# Patient Record
Sex: MALE | Race: WHITE | Employment: FULL TIME | URBAN - METROPOLITAN AREA
[De-identification: names, ages, dates, MRNs, and addresses within clinical notes are randomized per-mention and may not be internally consistent; named-entity substitution may affect disease eponyms.]

---

## 2024-04-11 RX ORDER — OXYCODONE HCL 10 MG/1
10 TABLET, FILM COATED, EXTENDED RELEASE ORAL ONCE
Status: CANCELLED | OUTPATIENT
Start: 2024-04-11 | End: 2024-04-11

## 2024-04-11 RX ORDER — AMLODIPINE AND BENAZEPRIL HYDROCHLORIDE 5; 40 MG/1; MG/1
1 CAPSULE ORAL DAILY
COMMUNITY

## 2024-04-11 RX ORDER — METOPROLOL SUCCINATE 50 MG/1
50 TABLET, EXTENDED RELEASE ORAL DAILY
COMMUNITY

## 2024-04-11 RX ORDER — ROSUVASTATIN CALCIUM 10 MG/1
10 TABLET, COATED ORAL DAILY
COMMUNITY

## 2024-04-11 NOTE — PROGRESS NOTES
Name_______________________________________Printed:____________________  Date and time of surgery__5/9/24 @ 1230______________________Arrival Time:___1030 main hosp_____________   1. The instructions given regarding when and if a patient needs to stop oral intake prior to surgery varies.Follow the specific instructions you were given                  _x__Nothing to eat or to drink after Midnight the night before.                   ____Carbo loading or instructions will be given to select patients-if you have been given those instructions -please do the following                           The evening before your surgery after dinner before midnight drink 40 ounces of gatorade.If you are diabetic use sugar free.  The morning of surgery drink 40 ounces of water.This needs to be finished 3 hours prior to your surgery start time.    2. Take the following pills with a small sip of water on the morning of surgery__metoprolol_________________________________________________                  Do not take blood pressure medications ending in pril or sartan the lion prior to surgery or the morning of surgery. Dr Duenas's patient are not to take any medications the AM of surgery.         3. Aspirin, Ibuprofen, Advil, Naproxen, Vitamin E and other Anti-inflammatory products and supplements should be stopped for 5 -7days before surgery or as directed by your physician.   4. Check with your Doctor regarding stopping Plavix, Coumadin,Eliquis, Lovenox,Effient,Pradaxa,Xarelto, Fragmin or other blood thinners and follow their instructions.   5. Do not smoke, and do not drink any alcoholic beverages 24 hours prior to surgery.  This includes NA Beer.Refrain from the usage of any recreational drugs.   6. You may brush your teeth and gargle the morning of surgery.  DO NOT SWALLOW WATER   7. You MUST make arrangements for a responsible adult to stay on site while you are here and take you home after your surgery. You will not be allowed to

## 2024-04-18 ENCOUNTER — HOSPITAL ENCOUNTER (OUTPATIENT)
Age: 64
Discharge: HOME | End: 2024-04-18
Payer: COMMERCIAL

## 2024-04-18 DIAGNOSIS — Z01.818 PREOP TESTING: ICD-10-CM

## 2024-04-18 LAB
ABO + RH BLD: NORMAL
ALBUMIN SERPL-MCNC: 4.9 G/DL (ref 3.4–5)
ALBUMIN/GLOB SERPL: 1.6 {RATIO} (ref 1.1–2.2)
ALP SERPL-CCNC: 79 U/L (ref 40–129)
ALT SERPL-CCNC: 33 U/L (ref 10–40)
ANION GAP SERPL CALCULATED.3IONS-SCNC: 13 MMOL/L (ref 3–16)
APTT BLD: 29.8 SEC (ref 22.1–36.4)
AST SERPL-CCNC: 27 U/L (ref 15–37)
BASOPHILS # BLD: 0 K/UL (ref 0–0.2)
BASOPHILS NFR BLD: 0.5 %
BILIRUB SERPL-MCNC: 0.8 MG/DL (ref 0–1)
BILIRUB UR QL STRIP.AUTO: NEGATIVE
BLD GP AB SCN SERPL QL: NORMAL
BUN SERPL-MCNC: 12 MG/DL (ref 7–20)
CALCIUM SERPL-MCNC: 9.8 MG/DL (ref 8.3–10.6)
CHLORIDE SERPL-SCNC: 94 MMOL/L (ref 99–110)
CLARITY UR: CLEAR
CO2 SERPL-SCNC: 24 MMOL/L (ref 21–32)
COLOR UR: YELLOW
CREAT SERPL-MCNC: 0.7 MG/DL (ref 0.8–1.3)
DEPRECATED RDW RBC AUTO: 12.7 % (ref 12.4–15.4)
EOSINOPHIL # BLD: 0.5 K/UL (ref 0–0.6)
EOSINOPHIL NFR BLD: 8.4 %
GFR SERPLBLD CREATININE-BSD FMLA CKD-EPI: >90 ML/MIN/{1.73_M2}
GLUCOSE SERPL-MCNC: 104 MG/DL (ref 70–99)
GLUCOSE UR STRIP.AUTO-MCNC: NEGATIVE MG/DL
HCT VFR BLD AUTO: 38.9 % (ref 40.5–52.5)
HGB BLD-MCNC: 14 G/DL (ref 13.5–17.5)
HGB UR QL STRIP.AUTO: NEGATIVE
INR PPP: 0.98 (ref 0.85–1.15)
KETONES UR STRIP.AUTO-MCNC: NEGATIVE MG/DL
LEUKOCYTE ESTERASE UR QL STRIP.AUTO: NEGATIVE
LYMPHOCYTES # BLD: 1.7 K/UL (ref 1–5.1)
LYMPHOCYTES NFR BLD: 27 %
MCH RBC QN AUTO: 34.1 PG (ref 26–34)
MCHC RBC AUTO-ENTMCNC: 36.1 G/DL (ref 31–36)
MCV RBC AUTO: 94.6 FL (ref 80–100)
MONOCYTES # BLD: 0.7 K/UL (ref 0–1.3)
MONOCYTES NFR BLD: 10.5 %
NEUTROPHILS # BLD: 3.4 K/UL (ref 1.7–7.7)
NEUTROPHILS NFR BLD: 53.6 %
NITRITE UR QL STRIP.AUTO: NEGATIVE
PH UR STRIP.AUTO: 7 [PH] (ref 5–8)
PLATELET # BLD AUTO: 254 K/UL (ref 135–450)
PMV BLD AUTO: 7.3 FL (ref 5–10.5)
POTASSIUM SERPL-SCNC: 4 MMOL/L (ref 3.5–5.1)
PROT SERPL-MCNC: 7.9 G/DL (ref 6.4–8.2)
PROT UR STRIP.AUTO-MCNC: NEGATIVE MG/DL
PROTHROMBIN TIME: 13.2 SEC (ref 11.9–14.9)
RBC # BLD AUTO: 4.11 M/UL (ref 4.2–5.9)
SODIUM SERPL-SCNC: 131 MMOL/L (ref 136–145)
SP GR UR STRIP.AUTO: 1.01 (ref 1–1.03)
UA DIPSTICK W REFLEX MICRO PNL UR: NORMAL
URN SPEC COLLECT METH UR: NORMAL
UROBILINOGEN UR STRIP-ACNC: 1 E.U./DL
WBC # BLD AUTO: 6.4 K/UL (ref 4–11)

## 2024-04-18 PROCEDURE — 86850 RBC ANTIBODY SCREEN: CPT

## 2024-04-18 PROCEDURE — 85730 THROMBOPLASTIN TIME PARTIAL: CPT

## 2024-04-18 PROCEDURE — 85610 PROTHROMBIN TIME: CPT

## 2024-04-18 PROCEDURE — 80053 COMPREHEN METABOLIC PANEL: CPT

## 2024-04-18 PROCEDURE — 83036 HEMOGLOBIN GLYCOSYLATED A1C: CPT

## 2024-04-18 PROCEDURE — 87081 CULTURE SCREEN ONLY: CPT

## 2024-04-18 PROCEDURE — 36415 COLL VENOUS BLD VENIPUNCTURE: CPT

## 2024-04-18 PROCEDURE — 85025 COMPLETE CBC W/AUTO DIFF WBC: CPT

## 2024-04-18 PROCEDURE — 86901 BLOOD TYPING SEROLOGIC RH(D): CPT

## 2024-04-18 PROCEDURE — 81003 URINALYSIS AUTO W/O SCOPE: CPT

## 2024-04-18 PROCEDURE — 87086 URINE CULTURE/COLONY COUNT: CPT

## 2024-04-18 PROCEDURE — 86900 BLOOD TYPING SEROLOGIC ABO: CPT

## 2024-04-19 LAB
BACTERIA UR CULT: NORMAL
EKG ATRIAL RATE: 85 BPM
EKG DIAGNOSIS: NORMAL
EKG P AXIS: 61 DEGREES
EKG P-R INTERVAL: 176 MS
EKG Q-T INTERVAL: 370 MS
EKG QRS DURATION: 92 MS
EKG QTC CALCULATION (BAZETT): 440 MS
EKG R AXIS: 23 DEGREES
EKG T AXIS: 51 DEGREES
EKG VENTRICULAR RATE: 85 BPM
EST. AVERAGE GLUCOSE BLD GHB EST-MCNC: 96.8 MG/DL
HBA1C MFR BLD: 5 %

## 2024-04-20 LAB — MRSA SPEC QL CULT: NORMAL

## 2024-05-08 RX ORDER — TRANEXAMIC ACID 10 MG/ML
1000 INJECTION, SOLUTION INTRAVENOUS
Status: COMPLETED | OUTPATIENT
Start: 2024-05-09 | End: 2024-05-09

## 2024-05-08 RX ORDER — TRANEXAMIC ACID 10 MG/ML
1000 INJECTION, SOLUTION INTRAVENOUS ONCE
Status: COMPLETED | OUTPATIENT
Start: 2024-05-09 | End: 2024-05-09

## 2024-05-08 RX ORDER — DEXAMETHASONE SODIUM PHOSPHATE 4 MG/ML
10 INJECTION, SOLUTION INTRA-ARTICULAR; INTRALESIONAL; INTRAMUSCULAR; INTRAVENOUS; SOFT TISSUE ONCE
Status: COMPLETED | OUTPATIENT
Start: 2024-05-09 | End: 2024-05-09

## 2024-05-08 NOTE — DISCHARGE INSTRUCTIONS
[facility] DISCHARGE ORDER SET HARESH    1. (x   )  Activity instructions for ECF/HOME  Elevate extremity if swelling occurs  ICE to operative site   20 minutes/hour while awake  apply towel over dressing when icing  Continue the exercise program as prescribed by PT/OT   Use walker, crutches or cane with weightbearing instructions as indicated by MD  Do not ambulate without assistance until cleared by PT  Out of bed every hour while awake, ambulating minimum 10 minutes  Ankle pumps bilateral ankles 15x every hour while awake  IS Spirometer every 2 hrs while awake  Drink minimum  Eight  8 oz glasses of water daily       2. ( x  ) Initiate bowel care with the following medications  Colace 100 mg 1 tab by mouth twice daily, continue while on narcotics, hold for loose stools.    Call office if you have any difficulties with bowel movements/urinating after surgery        3. ( x  ) Admit s/p   (  x ) right    (   ) left    (  X )  revision TKA        4.  (x) START OUTPATIENT PHYSICAL THERAPY ON MONDAY  CALL OFFICE      TO   SCHEDULE [phone]        (  x  ) PT    (   ) OT    Rom, strengthening ex, ADL's       ( X  ) Home PT 3 x a week for 2 weeks vs       (X) outpatient PT 2-3 x a week for 4 weeks       (   ) PT daily    5. (x   )  Weight bearing/limitations   (  x ) right  (   ) left    (  X ) lower        extremity                 (  X ) FWB    (  x ) CPM  Start 0-60 degrees,day after surgery, increase 10 degrees daily or as tolerated, use minimum     6 hrs/day   Dc CPM when AROM > 120 degrees    6. (   )  Labs  Pt/INR  on Monday and Thursdays for 4 weeks, call results to   [phone]  before 3 pm for coumadin dose    7.( x  ) Dressing/wound care  Ok to shower on Saturday  Knee replacement: Remove ace wrap/gauze on post op day 2  Leave Clear Therabond dressing on until first post op appt with MD  If you had knee replacement cover  knee with saran wrap when showering, remove saran wrap after showering   Avoid

## 2024-05-09 ENCOUNTER — ANESTHESIA EVENT (OUTPATIENT)
Dept: OPERATING ROOM | Age: 64
End: 2024-05-09
Payer: COMMERCIAL

## 2024-05-09 ENCOUNTER — APPOINTMENT (OUTPATIENT)
Dept: GENERAL RADIOLOGY | Age: 64
End: 2024-05-09
Attending: ORTHOPAEDIC SURGERY
Payer: COMMERCIAL

## 2024-05-09 ENCOUNTER — HOSPITAL ENCOUNTER (OUTPATIENT)
Age: 64
Setting detail: SURGERY ADMIT
Discharge: HOME | End: 2024-05-09
Attending: ORTHOPAEDIC SURGERY | Admitting: ORTHOPAEDIC SURGERY
Payer: COMMERCIAL

## 2024-05-09 ENCOUNTER — ANESTHESIA (OUTPATIENT)
Dept: OPERATING ROOM | Age: 64
End: 2024-05-09
Payer: COMMERCIAL

## 2024-05-09 VITALS
OXYGEN SATURATION: 94 % | SYSTOLIC BLOOD PRESSURE: 136 MMHG | WEIGHT: 267.8 LBS | TEMPERATURE: 97.3 F | HEART RATE: 103 BPM | BODY MASS INDEX: 38.34 KG/M2 | DIASTOLIC BLOOD PRESSURE: 85 MMHG | HEIGHT: 70 IN | RESPIRATION RATE: 21 BRPM

## 2024-05-09 DIAGNOSIS — Z01.818 PREOP TESTING: Primary | ICD-10-CM

## 2024-05-09 DIAGNOSIS — T84.033A LOOSENING OF PROSTHESIS OF LEFT TOTAL KNEE REPLACEMENT, INITIAL ENCOUNTER (HCC): ICD-10-CM

## 2024-05-09 PROBLEM — I10 HTN (HYPERTENSION): Status: ACTIVE | Noted: 2024-05-09

## 2024-05-09 PROBLEM — T84.032A MECHANICAL LOOSENING OF INTERNAL RIGHT KNEE PROSTHETIC JOINT (HCC): Status: ACTIVE | Noted: 2024-05-09

## 2024-05-09 PROBLEM — E87.1 HYPONATREMIA: Status: ACTIVE | Noted: 2024-05-09

## 2024-05-09 PROBLEM — E78.00 HIGH CHOLESTEROL: Status: ACTIVE | Noted: 2024-05-09

## 2024-05-09 LAB
ABO + RH BLD: NORMAL
BLD GP AB SCN SERPL QL: NORMAL
GLUCOSE BLD-MCNC: 122 MG/DL (ref 70–99)
PERFORMED ON: ABNORMAL

## 2024-05-09 PROCEDURE — 2500000003 HC RX 250 WO HCPCS: Performed by: ORTHOPAEDIC SURGERY

## 2024-05-09 PROCEDURE — 7100000000 HC PACU RECOVERY - FIRST 15 MIN: Performed by: ORTHOPAEDIC SURGERY

## 2024-05-09 PROCEDURE — 6360000002 HC RX W HCPCS: Performed by: ANESTHESIOLOGY

## 2024-05-09 PROCEDURE — 86850 RBC ANTIBODY SCREEN: CPT

## 2024-05-09 PROCEDURE — 64447 NJX AA&/STRD FEMORAL NRV IMG: CPT | Performed by: ANESTHESIOLOGY

## 2024-05-09 PROCEDURE — 2580000003 HC RX 258: Performed by: ORTHOPAEDIC SURGERY

## 2024-05-09 PROCEDURE — 73560 X-RAY EXAM OF KNEE 1 OR 2: CPT

## 2024-05-09 PROCEDURE — 3700000001 HC ADD 15 MINUTES (ANESTHESIA): Performed by: ORTHOPAEDIC SURGERY

## 2024-05-09 PROCEDURE — C1776 JOINT DEVICE (IMPLANTABLE): HCPCS | Performed by: ORTHOPAEDIC SURGERY

## 2024-05-09 PROCEDURE — 7100000001 HC PACU RECOVERY - ADDTL 15 MIN: Performed by: ORTHOPAEDIC SURGERY

## 2024-05-09 PROCEDURE — 3600000004 HC SURGERY LEVEL 4 BASE: Performed by: ORTHOPAEDIC SURGERY

## 2024-05-09 PROCEDURE — 6360000002 HC RX W HCPCS: Performed by: ORTHOPAEDIC SURGERY

## 2024-05-09 PROCEDURE — 97165 OT EVAL LOW COMPLEX 30 MIN: CPT

## 2024-05-09 PROCEDURE — 7100000010 HC PHASE II RECOVERY - FIRST 15 MIN: Performed by: ORTHOPAEDIC SURGERY

## 2024-05-09 PROCEDURE — 86900 BLOOD TYPING SEROLOGIC ABO: CPT

## 2024-05-09 PROCEDURE — 2709999900 HC NON-CHARGEABLE SUPPLY: Performed by: ORTHOPAEDIC SURGERY

## 2024-05-09 PROCEDURE — 86901 BLOOD TYPING SEROLOGIC RH(D): CPT

## 2024-05-09 PROCEDURE — 3700000000 HC ANESTHESIA ATTENDED CARE: Performed by: ORTHOPAEDIC SURGERY

## 2024-05-09 PROCEDURE — 2500000003 HC RX 250 WO HCPCS: Performed by: NURSE ANESTHETIST, CERTIFIED REGISTERED

## 2024-05-09 PROCEDURE — 2720000010 HC SURG SUPPLY STERILE: Performed by: ORTHOPAEDIC SURGERY

## 2024-05-09 PROCEDURE — C1713 ANCHOR/SCREW BN/BN,TIS/BN: HCPCS | Performed by: ORTHOPAEDIC SURGERY

## 2024-05-09 PROCEDURE — 3600000014 HC SURGERY LEVEL 4 ADDTL 15MIN: Performed by: ORTHOPAEDIC SURGERY

## 2024-05-09 PROCEDURE — 6370000000 HC RX 637 (ALT 250 FOR IP): Performed by: ORTHOPAEDIC SURGERY

## 2024-05-09 PROCEDURE — 7100000011 HC PHASE II RECOVERY - ADDTL 15 MIN: Performed by: ORTHOPAEDIC SURGERY

## 2024-05-09 PROCEDURE — 6360000002 HC RX W HCPCS: Performed by: NURSE ANESTHETIST, CERTIFIED REGISTERED

## 2024-05-09 PROCEDURE — 97535 SELF CARE MNGMENT TRAINING: CPT

## 2024-05-09 PROCEDURE — 97161 PT EVAL LOW COMPLEX 20 MIN: CPT

## 2024-05-09 PROCEDURE — 6370000000 HC RX 637 (ALT 250 FOR IP): Performed by: ANESTHESIOLOGY

## 2024-05-09 PROCEDURE — 36415 COLL VENOUS BLD VENIPUNCTURE: CPT

## 2024-05-09 DEVICE — IMPLANTABLE DEVICE
Type: IMPLANTABLE DEVICE | Site: KNEE | Status: FUNCTIONAL
Brand: PERSONA®

## 2024-05-09 DEVICE — BONE PREPARATION KIT
Type: IMPLANTABLE DEVICE | Site: KNEE | Status: FUNCTIONAL
Brand: BIOPREP

## 2024-05-09 DEVICE — IMPLANTABLE DEVICE
Type: IMPLANTABLE DEVICE | Site: KNEE | Status: FUNCTIONAL
Brand: PERSONA® VIVACIT-E®

## 2024-05-09 DEVICE — IMPLANTABLE DEVICE
Type: IMPLANTABLE DEVICE | Site: KNEE | Status: FUNCTIONAL
Brand: BIOMET® BONE CEMENT R

## 2024-05-09 DEVICE — IMPLANTABLE DEVICE: Type: IMPLANTABLE DEVICE | Site: KNEE | Status: FUNCTIONAL

## 2024-05-09 RX ORDER — ONDANSETRON 2 MG/ML
INJECTION INTRAMUSCULAR; INTRAVENOUS PRN
Status: DISCONTINUED | OUTPATIENT
Start: 2024-05-09 | End: 2024-05-09 | Stop reason: SDUPTHER

## 2024-05-09 RX ORDER — ROSUVASTATIN CALCIUM 10 MG/1
10 TABLET, COATED ORAL DAILY
Status: DISCONTINUED | OUTPATIENT
Start: 2024-05-09 | End: 2024-05-09 | Stop reason: HOSPADM

## 2024-05-09 RX ORDER — KETAMINE HCL IN NACL, ISO-OSM 100MG/10ML
SYRINGE (ML) INJECTION PRN
Status: DISCONTINUED | OUTPATIENT
Start: 2024-05-09 | End: 2024-05-09 | Stop reason: SDUPTHER

## 2024-05-09 RX ORDER — AMLODIPINE AND BENAZEPRIL HYDROCHLORIDE 5; 40 MG/1; MG/1
1 CAPSULE ORAL DAILY
Status: DISCONTINUED | OUTPATIENT
Start: 2024-05-09 | End: 2024-05-09 | Stop reason: SDUPTHER

## 2024-05-09 RX ORDER — HYDROMORPHONE HYDROCHLORIDE 2 MG/ML
0.25 INJECTION, SOLUTION INTRAMUSCULAR; INTRAVENOUS; SUBCUTANEOUS
Status: CANCELLED | OUTPATIENT
Start: 2024-05-09

## 2024-05-09 RX ORDER — SODIUM CHLORIDE 0.9 % (FLUSH) 0.9 %
5-40 SYRINGE (ML) INJECTION EVERY 12 HOURS SCHEDULED
Status: DISCONTINUED | OUTPATIENT
Start: 2024-05-09 | End: 2024-05-09 | Stop reason: HOSPADM

## 2024-05-09 RX ORDER — BUPIVACAINE HYDROCHLORIDE 5 MG/ML
INJECTION, SOLUTION EPIDURAL; INTRACAUDAL
Status: COMPLETED | OUTPATIENT
Start: 2024-05-09 | End: 2024-05-09

## 2024-05-09 RX ORDER — ONDANSETRON 2 MG/ML
4 INJECTION INTRAMUSCULAR; INTRAVENOUS
Status: DISCONTINUED | OUTPATIENT
Start: 2024-05-09 | End: 2024-05-09 | Stop reason: HOSPADM

## 2024-05-09 RX ORDER — SODIUM CHLORIDE 9 MG/ML
INJECTION, SOLUTION INTRAVENOUS PRN
Status: CANCELLED | OUTPATIENT
Start: 2024-05-09

## 2024-05-09 RX ORDER — CEFADROXIL 500 MG/1
500 CAPSULE ORAL 2 TIMES DAILY
Qty: 14 CAPSULE | Refills: 0 | Status: SHIPPED | OUTPATIENT
Start: 2024-05-09 | End: 2024-05-16

## 2024-05-09 RX ORDER — LISINOPRIL 20 MG/1
40 TABLET ORAL DAILY
Status: DISCONTINUED | OUTPATIENT
Start: 2024-05-09 | End: 2024-05-09 | Stop reason: HOSPADM

## 2024-05-09 RX ORDER — FENTANYL CITRATE 50 UG/ML
25 INJECTION, SOLUTION INTRAMUSCULAR; INTRAVENOUS EVERY 5 MIN PRN
Status: DISCONTINUED | OUTPATIENT
Start: 2024-05-09 | End: 2024-05-09 | Stop reason: HOSPADM

## 2024-05-09 RX ORDER — MAGNESIUM HYDROXIDE 1200 MG/15ML
LIQUID ORAL CONTINUOUS PRN
Status: COMPLETED | OUTPATIENT
Start: 2024-05-09 | End: 2024-05-09

## 2024-05-09 RX ORDER — DOCUSATE SODIUM 100 MG/1
100 CAPSULE, LIQUID FILLED ORAL 2 TIMES DAILY
Qty: 60 CAPSULE | Refills: 0 | Status: SHIPPED | OUTPATIENT
Start: 2024-05-09 | End: 2024-06-08

## 2024-05-09 RX ORDER — SODIUM CHLORIDE 0.9 % (FLUSH) 0.9 %
5-40 SYRINGE (ML) INJECTION EVERY 12 HOURS SCHEDULED
Status: CANCELLED | OUTPATIENT
Start: 2024-05-09

## 2024-05-09 RX ORDER — SODIUM CHLORIDE 9 MG/ML
INJECTION, SOLUTION INTRAVENOUS PRN
Status: DISCONTINUED | OUTPATIENT
Start: 2024-05-09 | End: 2024-05-09 | Stop reason: HOSPADM

## 2024-05-09 RX ORDER — ASPIRIN 81 MG/1
81 TABLET ORAL DAILY
Qty: 30 TABLET | Refills: 0 | Status: SHIPPED | OUTPATIENT
Start: 2024-05-09

## 2024-05-09 RX ORDER — HYDRALAZINE HYDROCHLORIDE 20 MG/ML
10 INJECTION INTRAMUSCULAR; INTRAVENOUS
Status: DISCONTINUED | OUTPATIENT
Start: 2024-05-09 | End: 2024-05-09 | Stop reason: HOSPADM

## 2024-05-09 RX ORDER — LIDOCAINE HYDROCHLORIDE 20 MG/ML
INJECTION, SOLUTION EPIDURAL; INFILTRATION; INTRACAUDAL; PERINEURAL PRN
Status: DISCONTINUED | OUTPATIENT
Start: 2024-05-09 | End: 2024-05-09 | Stop reason: SDUPTHER

## 2024-05-09 RX ORDER — MAGNESIUM SULFATE HEPTAHYDRATE 500 MG/ML
INJECTION, SOLUTION INTRAMUSCULAR; INTRAVENOUS PRN
Status: DISCONTINUED | OUTPATIENT
Start: 2024-05-09 | End: 2024-05-09 | Stop reason: SDUPTHER

## 2024-05-09 RX ORDER — LABETALOL HYDROCHLORIDE 5 MG/ML
10 INJECTION, SOLUTION INTRAVENOUS
Status: DISCONTINUED | OUTPATIENT
Start: 2024-05-09 | End: 2024-05-09 | Stop reason: HOSPADM

## 2024-05-09 RX ORDER — ASPIRIN 81 MG/1
81 TABLET ORAL DAILY
Status: CANCELLED | OUTPATIENT
Start: 2024-05-09

## 2024-05-09 RX ORDER — OXYCODONE HYDROCHLORIDE 5 MG/1
5 TABLET ORAL
Status: COMPLETED | OUTPATIENT
Start: 2024-05-09 | End: 2024-05-09

## 2024-05-09 RX ORDER — SODIUM CHLORIDE, SODIUM LACTATE, POTASSIUM CHLORIDE, CALCIUM CHLORIDE 600; 310; 30; 20 MG/100ML; MG/100ML; MG/100ML; MG/100ML
INJECTION, SOLUTION INTRAVENOUS CONTINUOUS
Status: DISCONTINUED | OUTPATIENT
Start: 2024-05-09 | End: 2024-05-09 | Stop reason: HOSPADM

## 2024-05-09 RX ORDER — OXYCODONE HYDROCHLORIDE 5 MG/1
5 TABLET ORAL EVERY 4 HOURS PRN
Status: CANCELLED | OUTPATIENT
Start: 2024-05-09

## 2024-05-09 RX ORDER — AMLODIPINE BESYLATE 5 MG/1
5 TABLET ORAL DAILY
Status: DISCONTINUED | OUTPATIENT
Start: 2024-05-09 | End: 2024-05-09 | Stop reason: HOSPADM

## 2024-05-09 RX ORDER — OXYCODONE HCL 20 MG/1
20 TABLET, FILM COATED, EXTENDED RELEASE ORAL ONCE
Status: COMPLETED | OUTPATIENT
Start: 2024-05-09 | End: 2024-05-09

## 2024-05-09 RX ORDER — PROPOFOL 10 MG/ML
INJECTION, EMULSION INTRAVENOUS PRN
Status: DISCONTINUED | OUTPATIENT
Start: 2024-05-09 | End: 2024-05-09 | Stop reason: SDUPTHER

## 2024-05-09 RX ORDER — OMEPRAZOLE 20 MG/1
20 TABLET, DELAYED RELEASE ORAL DAILY
Qty: 30 TABLET | Refills: 3 | Status: SHIPPED | OUTPATIENT
Start: 2024-05-09

## 2024-05-09 RX ORDER — MELOXICAM 15 MG/1
15 TABLET ORAL DAILY
Qty: 30 TABLET | Refills: 3 | Status: SHIPPED | OUTPATIENT
Start: 2024-05-09

## 2024-05-09 RX ORDER — FENTANYL CITRATE 50 UG/ML
INJECTION, SOLUTION INTRAMUSCULAR; INTRAVENOUS PRN
Status: DISCONTINUED | OUTPATIENT
Start: 2024-05-09 | End: 2024-05-09 | Stop reason: SDUPTHER

## 2024-05-09 RX ORDER — HYDROMORPHONE HYDROCHLORIDE 2 MG/ML
0.5 INJECTION, SOLUTION INTRAMUSCULAR; INTRAVENOUS; SUBCUTANEOUS EVERY 5 MIN PRN
Status: DISCONTINUED | OUTPATIENT
Start: 2024-05-09 | End: 2024-05-09 | Stop reason: HOSPADM

## 2024-05-09 RX ORDER — ACETAMINOPHEN 500 MG
1000 TABLET ORAL EVERY 8 HOURS SCHEDULED
Status: CANCELLED | OUTPATIENT
Start: 2024-05-09

## 2024-05-09 RX ORDER — PROCHLORPERAZINE EDISYLATE 5 MG/ML
5 INJECTION INTRAMUSCULAR; INTRAVENOUS
Status: DISCONTINUED | OUTPATIENT
Start: 2024-05-09 | End: 2024-05-09 | Stop reason: HOSPADM

## 2024-05-09 RX ORDER — SODIUM CHLORIDE 0.9 % (FLUSH) 0.9 %
5-40 SYRINGE (ML) INJECTION PRN
Status: DISCONTINUED | OUTPATIENT
Start: 2024-05-09 | End: 2024-05-09 | Stop reason: HOSPADM

## 2024-05-09 RX ORDER — MIDAZOLAM HYDROCHLORIDE 2 MG/2ML
1 INJECTION, SOLUTION INTRAMUSCULAR; INTRAVENOUS ONCE
Status: COMPLETED | OUTPATIENT
Start: 2024-05-09 | End: 2024-05-09

## 2024-05-09 RX ORDER — METOPROLOL SUCCINATE 50 MG/1
50 TABLET, EXTENDED RELEASE ORAL DAILY
Status: DISCONTINUED | OUTPATIENT
Start: 2024-05-10 | End: 2024-05-09 | Stop reason: HOSPADM

## 2024-05-09 RX ORDER — FENTANYL CITRATE 50 UG/ML
50 INJECTION, SOLUTION INTRAMUSCULAR; INTRAVENOUS ONCE
Status: COMPLETED | OUTPATIENT
Start: 2024-05-09 | End: 2024-05-09

## 2024-05-09 RX ORDER — ROCURONIUM BROMIDE 10 MG/ML
INJECTION, SOLUTION INTRAVENOUS PRN
Status: DISCONTINUED | OUTPATIENT
Start: 2024-05-09 | End: 2024-05-09 | Stop reason: SDUPTHER

## 2024-05-09 RX ORDER — VANCOMYCIN HYDROCHLORIDE 1 G/20ML
INJECTION, POWDER, LYOPHILIZED, FOR SOLUTION INTRAVENOUS
Status: COMPLETED | OUTPATIENT
Start: 2024-05-09 | End: 2024-05-09

## 2024-05-09 RX ORDER — SODIUM CHLORIDE 9 MG/ML
INJECTION, SOLUTION INTRAVENOUS CONTINUOUS
Status: CANCELLED | OUTPATIENT
Start: 2024-05-09

## 2024-05-09 RX ORDER — SUCCINYLCHOLINE/SOD CL,ISO/PF 200MG/10ML
SYRINGE (ML) INTRAVENOUS PRN
Status: DISCONTINUED | OUTPATIENT
Start: 2024-05-09 | End: 2024-05-09 | Stop reason: SDUPTHER

## 2024-05-09 RX ORDER — OXYCODONE HYDROCHLORIDE 5 MG/1
10 TABLET ORAL EVERY 4 HOURS PRN
Status: CANCELLED | OUTPATIENT
Start: 2024-05-09

## 2024-05-09 RX ORDER — LIDOCAINE HYDROCHLORIDE 10 MG/ML
0.5 INJECTION, SOLUTION EPIDURAL; INFILTRATION; INTRACAUDAL; PERINEURAL ONCE
Status: DISCONTINUED | OUTPATIENT
Start: 2024-05-09 | End: 2024-05-09 | Stop reason: HOSPADM

## 2024-05-09 RX ORDER — GABAPENTIN 300 MG/1
300 CAPSULE ORAL NIGHTLY
Qty: 30 CAPSULE | Refills: 0 | Status: SHIPPED | OUTPATIENT
Start: 2024-05-09 | End: 2024-06-08

## 2024-05-09 RX ORDER — ACETAMINOPHEN 500 MG
1000 TABLET ORAL 3 TIMES DAILY
COMMUNITY
Start: 2024-05-09

## 2024-05-09 RX ORDER — SODIUM CHLORIDE 0.9 % (FLUSH) 0.9 %
5-40 SYRINGE (ML) INJECTION PRN
Status: CANCELLED | OUTPATIENT
Start: 2024-05-09

## 2024-05-09 RX ORDER — OXYCODONE HYDROCHLORIDE 5 MG/1
5 TABLET ORAL EVERY 6 HOURS PRN
Qty: 28 TABLET | Refills: 0 | Status: SHIPPED | OUTPATIENT
Start: 2024-05-09 | End: 2024-05-16

## 2024-05-09 RX ORDER — LIDOCAINE HYDROCHLORIDE 10 MG/ML
1 INJECTION, SOLUTION EPIDURAL; INFILTRATION; INTRACAUDAL; PERINEURAL
Status: DISCONTINUED | OUTPATIENT
Start: 2024-05-09 | End: 2024-05-09 | Stop reason: HOSPADM

## 2024-05-09 RX ORDER — HYDROMORPHONE HYDROCHLORIDE 2 MG/ML
0.5 INJECTION, SOLUTION INTRAMUSCULAR; INTRAVENOUS; SUBCUTANEOUS
Status: CANCELLED | OUTPATIENT
Start: 2024-05-09

## 2024-05-09 RX ORDER — BISACODYL 10 MG
10 SUPPOSITORY, RECTAL RECTAL DAILY PRN
Status: CANCELLED | OUTPATIENT
Start: 2024-05-09

## 2024-05-09 RX ORDER — DEXMEDETOMIDINE HYDROCHLORIDE 100 UG/ML
INJECTION, SOLUTION INTRAVENOUS PRN
Status: DISCONTINUED | OUTPATIENT
Start: 2024-05-09 | End: 2024-05-09 | Stop reason: SDUPTHER

## 2024-05-09 RX ADMIN — PROPOFOL 200 MG: 10 INJECTION, EMULSION INTRAVENOUS at 12:09

## 2024-05-09 RX ADMIN — FENTANYL CITRATE 50 MCG: 50 INJECTION, SOLUTION INTRAMUSCULAR; INTRAVENOUS at 13:36

## 2024-05-09 RX ADMIN — PROPOFOL 50 MG: 10 INJECTION, EMULSION INTRAVENOUS at 14:11

## 2024-05-09 RX ADMIN — SODIUM CHLORIDE, POTASSIUM CHLORIDE, SODIUM LACTATE AND CALCIUM CHLORIDE: 600; 310; 30; 20 INJECTION, SOLUTION INTRAVENOUS at 13:16

## 2024-05-09 RX ADMIN — HYDROMORPHONE HYDROCHLORIDE 0.5 MG: 2 INJECTION, SOLUTION INTRAMUSCULAR; INTRAVENOUS; SUBCUTANEOUS at 15:23

## 2024-05-09 RX ADMIN — Medication 160 MG: at 12:09

## 2024-05-09 RX ADMIN — DEXAMETHASONE SODIUM PHOSPHATE 10 MG: 4 INJECTION, SOLUTION INTRAMUSCULAR; INTRAVENOUS at 10:56

## 2024-05-09 RX ADMIN — MAGNESIUM SULFATE HEPTAHYDRATE 1 G: 500 INJECTION, SOLUTION INTRAMUSCULAR; INTRAVENOUS at 12:46

## 2024-05-09 RX ADMIN — ROCURONIUM BROMIDE 10 MG: 10 INJECTION, SOLUTION INTRAVENOUS at 13:02

## 2024-05-09 RX ADMIN — Medication 25 MG: at 12:42

## 2024-05-09 RX ADMIN — HYDROMORPHONE HYDROCHLORIDE 0.5 MG: 2 INJECTION, SOLUTION INTRAMUSCULAR; INTRAVENOUS; SUBCUTANEOUS at 14:58

## 2024-05-09 RX ADMIN — TRANEXAMIC ACID 1000 MG: 10 INJECTION, SOLUTION INTRAVENOUS at 13:45

## 2024-05-09 RX ADMIN — SUGAMMADEX 200 MG: 100 INJECTION, SOLUTION INTRAVENOUS at 14:16

## 2024-05-09 RX ADMIN — ROCURONIUM BROMIDE 10 MG: 10 INJECTION, SOLUTION INTRAVENOUS at 13:34

## 2024-05-09 RX ADMIN — OXYCODONE HYDROCHLORIDE 5 MG: 5 TABLET ORAL at 16:34

## 2024-05-09 RX ADMIN — DEXMEDETOMIDINE HYDROCHLORIDE 10 MCG: 100 INJECTION, SOLUTION INTRAVENOUS at 14:06

## 2024-05-09 RX ADMIN — DEXMEDETOMIDINE HYDROCHLORIDE 10 MCG: 100 INJECTION, SOLUTION INTRAVENOUS at 12:09

## 2024-05-09 RX ADMIN — FENTANYL CITRATE 100 MCG: 50 INJECTION, SOLUTION INTRAMUSCULAR; INTRAVENOUS at 12:09

## 2024-05-09 RX ADMIN — TRANEXAMIC ACID 1000 MG: 10 INJECTION, SOLUTION INTRAVENOUS at 11:57

## 2024-05-09 RX ADMIN — Medication 25 MG: at 12:27

## 2024-05-09 RX ADMIN — FENTANYL CITRATE 50 MCG: 0.05 INJECTION, SOLUTION INTRAMUSCULAR; INTRAVENOUS at 11:35

## 2024-05-09 RX ADMIN — ROCURONIUM BROMIDE 5 MG: 10 INJECTION, SOLUTION INTRAVENOUS at 12:09

## 2024-05-09 RX ADMIN — DEXMEDETOMIDINE HYDROCHLORIDE 10 MCG: 100 INJECTION, SOLUTION INTRAVENOUS at 12:21

## 2024-05-09 RX ADMIN — SODIUM CHLORIDE, POTASSIUM CHLORIDE, SODIUM LACTATE AND CALCIUM CHLORIDE: 600; 310; 30; 20 INJECTION, SOLUTION INTRAVENOUS at 10:59

## 2024-05-09 RX ADMIN — FENTANYL CITRATE 50 MCG: 50 INJECTION, SOLUTION INTRAMUSCULAR; INTRAVENOUS at 12:51

## 2024-05-09 RX ADMIN — ONDANSETRON 4 MG: 2 INJECTION INTRAMUSCULAR; INTRAVENOUS at 12:15

## 2024-05-09 RX ADMIN — SODIUM CHLORIDE 3000 MG: 900 INJECTION INTRAVENOUS at 11:58

## 2024-05-09 RX ADMIN — MIDAZOLAM 1 MG: 1 INJECTION INTRAMUSCULAR; INTRAVENOUS at 11:35

## 2024-05-09 RX ADMIN — BUPIVACAINE HYDROCHLORIDE 20 ML: 5 INJECTION, SOLUTION EPIDURAL; INTRACAUDAL at 11:38

## 2024-05-09 RX ADMIN — LIDOCAINE HYDROCHLORIDE 100 MG: 20 INJECTION, SOLUTION EPIDURAL; INFILTRATION; INTRACAUDAL; PERINEURAL at 12:09

## 2024-05-09 RX ADMIN — OXYCODONE HYDROCHLORIDE 20 MG: 20 TABLET, FILM COATED, EXTENDED RELEASE ORAL at 10:56

## 2024-05-09 RX ADMIN — ROCURONIUM BROMIDE 45 MG: 10 INJECTION, SOLUTION INTRAVENOUS at 12:15

## 2024-05-09 ASSESSMENT — PAIN SCALES - GENERAL
PAINLEVEL_OUTOF10: 6
PAINLEVEL_OUTOF10: 0
PAINLEVEL_OUTOF10: 7
PAINLEVEL_OUTOF10: 4
PAINLEVEL_OUTOF10: 7
PAINLEVEL_OUTOF10: 0

## 2024-05-09 ASSESSMENT — PAIN - FUNCTIONAL ASSESSMENT: PAIN_FUNCTIONAL_ASSESSMENT: NONE - DENIES PAIN

## 2024-05-09 ASSESSMENT — PAIN DESCRIPTION - ORIENTATION
ORIENTATION: RIGHT

## 2024-05-09 ASSESSMENT — PAIN DESCRIPTION - LOCATION
LOCATION: KNEE

## 2024-05-09 ASSESSMENT — ENCOUNTER SYMPTOMS: SHORTNESS OF BREATH: 0

## 2024-05-09 ASSESSMENT — PAIN DESCRIPTION - PAIN TYPE: TYPE: SURGICAL PAIN

## 2024-05-09 ASSESSMENT — PAIN DESCRIPTION - DESCRIPTORS
DESCRIPTORS: DISCOMFORT

## 2024-05-09 NOTE — OP NOTE
Operative Note      Patient: Jaden Tellez  YOB: 1960  MRN: [id number]    Date of Procedure: 5/9/2024    Pre-Op Diagnosis Codes:     * Mechanical loosening of internal right knee prosthetic joint, initial encounter ([facility]) [T84.032A]     * Presence of right artificial knee joint [Z96.651]    Post-Op Diagnosis: Same       Procedure(s):  REVISION RIGHT TOTAL KNEE ARTHROPLASTY-ADDUCTOR BLOCK-REG    Surgeon(s):  Hudson Ricketts MD    Assistant:   Surgical Assistant: Zeus Reilly; Leida Leroy  Physician Assistant: Bethel Astudillo PA    Anesthesia: Spinal    Estimated Blood Loss (mL): Minimal    Complications: None    Specimens:   * No specimens in log *    Implants:  Implant Name Type Inv. Item Serial No.  Lot No. LRB No. Used Action   CEMENT BNE 40GM HI VISC RADPQ FOR REV SURG - [id number]  CEMENT BNE 40GM HI VISC RADPQ FOR REV SURG  [employer]- [id number] Right 1 Implanted   CEMENT BNE 40GM HI VISC RADPQ FOR REV SURG - [id number]  CEMENT BNE 40GM HI VISC RADPQ FOR REV SURG  [employer]- [id number] Right 1 Implanted   KIT BNE NEYMAR PREP UNIV W/ RESTRIC INSRT 2 NEYMAR SCULP FEM CNL - [id number]  KIT BNE NEYMAR PREP UNIV W/ RESTRIC INSRT 2 NEYMAR SCULP FEM CNL  m[employer]al [facility] [id number] Right 1 Implanted   PSN REV 3MM OFFSET STEM EXT [id number] - [id number]  PSN REV 3MM OFFSET STEM EXT [id number]  [employer]- [id number] Right 1 Implanted   PSN TIB HALF BLOCK SZ EF RL 15MM - [id number]  PSN TIB HALF BLOCK SZ EF RL 15MM  [employer]- [id number] Right 1 Implanted   PSN TIB HALF BLOCK SZ EF RM 15MM - [id number]  PSN TIB HALF BLOCK SZ EF RM 15MM  [employer]- [id number] Right 1 Implanted   PSN REV TIB FIXED KEEL CMT SZ E R - [id number]  PSN REV TIB FIXED KEEL CMT SZ E R  [employer]- [id number] Right 1 Implanted         Drains: * No LDAs found *    Findings:  Infection Present At Time Of  BY: Hudson Ricketts MD, 5/9/2024                Routing History                     Electronically signed by Hudson Ricketts MD on 5/9/2024 at 1:58 PM

## 2024-05-09 NOTE — PROGRESS NOTES
Patient to PACU from OR. Patient arousable to voice. VSS. X1 R incision w/therabond, cast padding, and double ace wrap which is CDI.

## 2024-05-09 NOTE — PROGRESS NOTES
[facility] - Inpatient Rehabilitation Department   Phone: [phone]    Physical Therapy    [x] Initial Evaluation            [] Daily Treatment Note         [] Discharge Summary      Patient: Jaden Tellez   : 1960   MRN: [id number]   Date of Service:  2024  Admitting Diagnosis: Mechanical loosening of internal right knee prosthetic joint (HCC)  Current Admission Summary: Jaden Tellez is a 63 year old male s/p R total knee arthroplasty with Dr. Ricketts on .   Past Medical History:  has a past medical history of Hyperlipidemia and Hypertension.  Past Surgical History:  has a past surgical history that includes eye surgery (Bilateral); joint replacement; shoulder surgery (Right); Colonoscopy; and Tonsillectomy.  Discharge Recommendations: Jaden Tellez scored a 20/24 on the AM-PAC short mobility form. Current research shows that an AM-PAC score of 18 or greater is typically associated with a discharge to the patient's home setting. Based on the patient's AM-PAC score and their current functional mobility deficits, it is recommended that the patient have 2-3 sessions per week of Physical Therapy at d/c to increase the patient's independence.  At this time, this patient demonstrates the endurance and safety to discharge home with outpatient physical therapy and a follow up treatment frequency of 2-3x/wk.  Please see assessment section for further patient specific details. If patient discharges prior to next session this note will serve as a discharge summary.  Please see below for the latest assessment towards goals.     DME Required For Discharge: rolling walker  Precautions/Restrictions: high fall risk  Weight Bearing Restrictions: weight bearing as tolerated   [x] Right Lower Extremity      Required Braces/Orthotics: no braces required   [] Right  [] Left  Positional Restrictions:no positional restrictions    Pre-Admission Information   Lives With: spouse    Type of Home:  house  Home Layout: two level, bedroom/bathroom upstairs, 13 stairs to 2nd level with L HR  Home Access:  1 + 1 +1 step to enter without rails   Bathroom Layout: walk in shower  Bathroom Equipment:  no prior equipment  Toilet Height: standard height  Home Equipment: rolling walker, single point cane, crutches  Transfer Assistance: Independent without use of device  Ambulation Assistance:Independent without use of device, intermittent use of SPC due to pain the last few weeks   ADL Assistance: independent with all ADL's  IADL Assistance:  Patient's wife completes   Active :        [x] Yes  [] No  Hand Dominance: [] Left  [x] Right  Current Employment: full time employment.  Occupation: [employer]   Recent Falls: Denies falls within the last 6 months     Examination   Vision:   Vision Gross Assessment: WFL and Visual History: glaucoma, cataracts, other - corrective eye surgery   Hearing:   WFL  Observation:   General Observation:  Patient in semi-vences's position. Ace wrap on (R) LE, thigh-high RONNIE hose on (L) LE  Posture:   WFL  Sensation:   reports numbness and tingling in all extremities  Proprioception:    WFL  Tone:   Normotonic  ROM:   (L) Knee: Grossly 5-75 degrees     (R) Knee: WFL  Strength:   (B) LE strength grossly WFL  Therapist Clinical Decision Making (Complexity): low complexity  Clinical Presentation: stable      Subjective  General: Patient pleasant and agreeable to PT/OT evaluation. Patient's wife present during session.   Pain: 2/10.  Location: right knee  Pain Interventions: pain medication in place prior to arrival and ice applied       Functional Mobility  Bed Mobility:  Supine to Sit: stand by assistance  Sit to Supine: stand by assistance  Scooting: stand by assistance  Comments: HOB elevated   Transfers:  Sit to stand transfer: contact guard assistance  Stand to sit transfer: contact guard assistance  Toilet transfer: contact guard assistance  Car transfer: contact guard

## 2024-05-09 NOTE — PROGRESS NOTES
Patient has transitioned out of Phase 1 care. Patient's pain is tolerable. Patient denies any nausea. VSS.R pedal pulses present via doppler. X1 R knee incision w/therabond, cast padding, and double ace wrap.

## 2024-05-09 NOTE — ANESTHESIA PROCEDURE NOTES
(5) A permanent ultrasound image was saved in the patient's record.            Medications Administered  BUPivacaine (MARCAINE) PF injection 0.5% - Perineural   20 mL - 5/9/2024 11:38:00 AM

## 2024-05-09 NOTE — PROGRESS NOTES
[facility] - Inpatient Rehabilitation Department   Phone: [phone]    Occupational Therapy    [x] Initial Evaluation            [] Daily Treatment Note         [] Discharge Summary      Patient: Jaden Tellez   : 1960   MRN: [id number]   Date of Service:  2024    Admitting Diagnosis:  Mechanical loosening of internal right knee prosthetic joint (HCC)  Current Admission Summary: Jaden Tellez is a 63 year old male s/p R total knee arthroplasty with Dr. Ricketts on .   Past Medical History:  has a past medical history of Hyperlipidemia and Hypertension.  Past Surgical History:  has a past surgical history that includes eye surgery (Bilateral); joint replacement; shoulder surgery (Right); Colonoscopy; and Tonsillectomy.    Discharge Recommendations: Jaden Tellez scored a 21/24 on the AM-PAC ADL Inpatient form. Current research shows that an AM-PAC score of 18 or greater is typically associated with a discharge to the patient's home setting. Based on the patient's AM-PAC score, and their current ADL deficits, it is recommended that the patient have 2-3 sessions per week of Occupational Therapy at d/c to increase the patient's independence.  At this time, this patient demonstrates the endurance and safety to discharge home with outpatient OT (home vs OP services) and a follow up treatment frequency of 2-3x/wk.   Please see assessment section for further patient specific details.    If patient discharges prior to next session this note will serve as a discharge summary.  Please see below for the latest assessment towards goals.       DME Required For Discharge: rolling walker, shower chair with back, grab bars - toilet, grab bars - shower    Precautions/Restrictions: high fall risk  Weight Bearing Restrictions: weight bearing as tolerated  [] Right Upper Extremity  [] Left Upper Extremity [x] Right Lower Extremity  [] Left Lower Extremity     Required Braces/Orthotics: no braces  by assistance washed hands in stance at sink  Upper Extremity Dressing: setup assistance  Lower Extremity Dressing: setup assistance contact guard assistance completed in sitting  Toileting: setup assistance contact guard assistance : pt completed toileting at standard commode (all components) with GGA progressing to SBA. No assist required for LB dressing or pericare.    Instrumental Activities of Daily Living  No IADL completed on this date.    Functional Mobility  Bed Mobility:  Supine to Sit: stand by assistance  Sit to Supine: stand by assistance  Scooting: stand by assistance  Comments:  Transfers:  Sit to stand transfer:stand by assistance, contact guard assistance  Stand to sit transfer: stand by assistance, contact guard assistance  Toilet transfer: stand by assistance, contact guard assistance  Toilet transfer equipment: standard toilet, grab bars  Comments:  Functional Mobility  Functional Mobility Activity: to/from bathroom, additional 50'+50'+100'  Device Use: rolling walker  Required Assistance: stand by assistance, contact guard assistance  Comment: Pt negotiated x5 steps ascending and descending with CGA using B hand rails, which he reports he has at home. Pt able to demo appropriate steps technique. See PT notes for further details regarding gait quality and distances ambulated.   Balance:  Static Sitting Balance: good(+): independent with high level dynamic balance in unsupported position  Dynamic Sitting Balance: good: independent with functional balance in unsupported position  Static Standing Balance: fair (+): maintains balance at SBA/supervision without use of UE support  Dynamic Standing Balance: fair (-): maintains balance at CGA with use of UE support  Comments:    Other Therapeutic Interventions    Functional Outcomes  AM-PAC Inpatient Daily Activity Raw Score: 21                                    Cognition  WFL  Orientation:    alert and oriented x 4  Command Following:

## 2024-05-09 NOTE — ANESTHESIA PRE PROCEDURE
Department of Anesthesiology  Preprocedure Note       Name:  Jaden Tellez   Age:  63 y.o.  :  1960                                          MRN:  [id number]         Date:  2024      Surgeon: Surgeon(s):  Hudson Ricketts MD    Procedure: Procedure(s):  REVISION RIGHT TOTAL KNEE ARTHROPLASTY-ADDUCTOR BLOCK-REG    Medications prior to admission:   Prior to Admission medications    Medication Sig Start Date End Date Taking? Authorizing Provider   rosuvastatin (CRESTOR) 10 MG tablet Take 1 tablet by mouth daily   Yes ProviderLeonidas MD   amLODIPine-benazepril (LOTREL) 5-40 MG per capsule Take 1 capsule by mouth daily   Yes ProviderLeonidas MD   metoprolol succinate (TOPROL XL) 50 MG extended release tablet Take 1 tablet by mouth daily   Yes ProviderLeonidas MD       Current medications:    Current Facility-Administered Medications   Medication Dose Route Frequency Provider Last Rate Last Admin    lactated ringers IV soln infusion   IntraVENous Continuous Hudson Ricketts MD        lidocaine PF 1 % injection 0.5 mL  0.5 mL IntraDERmal Once Hudson Ricketts MD        oxyCODONE (OXYCONTIN) extended release tablet 20 mg  20 mg Oral Once Hudson Ricketts MD        ceFAZolin Sodium (ANCEF) 3,000 mg in sodium chloride 0.9 % 100 mL (mini-bag)  3,000 mg IntraVENous On Call to OR Hudson Ricketts MD        dexAMETHasone Sodium Phosphate injection 10 mg  10 mg IntraVENous Once Hudsno Ricketts MD        tranexamic acid-NaCl IVPB premix 1,000 mg  1,000 mg IntraVENous Once Hudson Ricketts MD        tranexamic acid-NaCl IVPB premix 1,000 mg  1,000 mg IntraVENous On Call to OR Hudson Ricketts MD        ortho mix (Ajenderfer) injection   Injection On Call Hudson Ricketts MD           Allergies:    Allergies   Allergen Reactions    Brimonidine Itching     eyedrops    Lipitor [Atorvastatin] Other (See Comments)     Muscle aches

## 2024-05-09 NOTE — PROGRESS NOTES
Nursing care provided to prep patient for surgery.    Pre-op orders completed.  Verified patient has completed 5 days of CHG pre-op showers.  Pneumatic sleeves and compression stockings applied as ordered. Teaching / education initiated regarding perioperative experience, post-op expectations, plan of care, and pain management during hospital stay.  Patient verbalized understanding. Pain Goal Expectations 5/10.  The care plan and education has been reviewed and mutually agreed upon with the patient.

## 2024-05-09 NOTE — H&P
Date of Surgery Update:      Jaden Tellez  was seen, history and physical examination reviewed, and patient examined by me today. There have been no significant clinical changes since the completion of the previous history and physical.    The patient and I discussed that this is an elective procedure/surgery. We discussed the risks of the procedure/surgery, including but not limited to what is outlined in the signed informed consent. We also discussed the risk of ruba COVID 19 while in the facility. We discussed the increased risk of a bad outcome should the patient contract COVID 19 during the post-procedural/post-operative period, given the patient’s current health condition, chronic conditions, and the added risk of COVID 19 in light of these conditions. The patient and I also discussed the risk of further postponing the procedure/surgery and other treatment alternatives, including non-procedural/surgical treatments.       The risk, benefits, and alternatives of the proposed procedure have been explained to the patient (or appropriate guardian) and understanding verbalized. All questions answered. Patient wishes to proceed.        Electronically signed by: Hudson Ricketts MD,5/9/2024,11:06 AM

## 2024-05-09 NOTE — FLOWSHEET NOTE
Discharge note:  Discharge order obtained, and discharge instructions reviewed. Patient educated, using the teach back method, about follow up instructions and discharge instructions. A completed copy of the AVS instructions given to patient and all questions answered. IV catheter removed without complaints, catheter intact, site WNL. Discharged to lobby via wheel chair per RN.

## 2024-05-09 NOTE — ANESTHESIA POSTPROCEDURE EVALUATION
Department of Anesthesiology  Postprocedure Note    Patient: Jaden Tellez  MRN: [id number]  YOB: 1960  Date of evaluation: 5/9/2024    Procedure Summary       Date: 05/09/24 Room / Location: [address]    Anesthesia Start: 1204 Anesthesia Stop: 1440    Procedure: REVISION RIGHT TOTAL KNEE ARTHROPLASTY-ADDUCTOR BLOCK-REG (Right: Knee) Diagnosis:       Mechanical loosening of internal right knee prosthetic joint, initial encounter ([facility])      Presence of right artificial knee joint      (Mechanical loosening of internal right knee prosthetic joint, initial encounter ([facility]) [T84.032A])      (Presence of right artificial knee joint [Z96.651])    Surgeons: Hudson Ricketts MD Responsible Provider: Michael Anne MD    Anesthesia Type: general, regional ASA Status: 2            Anesthesia Type: No value filed.    Rosi Phase I: Rosi Score: 8    Rosi Phase II:      Anesthesia Post Evaluation    Patient location during evaluation: PACU  Patient participation: complete - patient participated  Level of consciousness: awake and alert  Airway patency: patent  Nausea & Vomiting: no vomiting and no nausea  Cardiovascular status: hemodynamically stable  Respiratory status: acceptable  Hydration status: stable  Pain management: adequate    No notable events documented.

## 2025-01-07 ENCOUNTER — HOSPITAL ENCOUNTER (OUTPATIENT)
Age: 65
Discharge: HOME | End: 2025-01-07
Payer: COMMERCIAL

## 2025-01-07 DIAGNOSIS — Z01.818 PREOP TESTING: ICD-10-CM

## 2025-01-07 LAB
ABO + RH BLD: NORMAL
ALBUMIN SERPL-MCNC: 3.8 G/DL (ref 3.4–5)
ALBUMIN/GLOB SERPL: 1.2 {RATIO} (ref 1.1–2.2)
ALP SERPL-CCNC: 84 U/L (ref 40–129)
ALT SERPL-CCNC: 29 U/L (ref 10–40)
ANION GAP SERPL CALCULATED.3IONS-SCNC: 11 MMOL/L (ref 3–16)
APTT BLD: 35.2 SEC (ref 22.1–36.4)
AST SERPL-CCNC: 24 U/L (ref 15–37)
BACTERIA URNS QL MICRO: NORMAL /HPF
BASOPHILS # BLD: 0 K/UL (ref 0–0.2)
BASOPHILS NFR BLD: 0.4 %
BILIRUB SERPL-MCNC: 1 MG/DL (ref 0–1)
BILIRUB UR QL STRIP.AUTO: NEGATIVE
BLD GP AB SCN SERPL QL: NORMAL
BUN SERPL-MCNC: 9 MG/DL (ref 7–20)
CALCIUM SERPL-MCNC: 9 MG/DL (ref 8.3–10.6)
CHLORIDE SERPL-SCNC: 91 MMOL/L (ref 99–110)
CLARITY UR: CLEAR
CO2 SERPL-SCNC: 22 MMOL/L (ref 21–32)
COLOR UR: ABNORMAL
CREAT SERPL-MCNC: 0.7 MG/DL (ref 0.8–1.3)
DEPRECATED RDW RBC AUTO: 12.6 % (ref 12.4–15.4)
EKG ATRIAL RATE: 92 BPM
EKG DIAGNOSIS: NORMAL
EKG P AXIS: 65 DEGREES
EKG P-R INTERVAL: 166 MS
EKG Q-T INTERVAL: 366 MS
EKG QRS DURATION: 98 MS
EKG QTC CALCULATION (BAZETT): 452 MS
EKG R AXIS: 15 DEGREES
EKG T AXIS: 24 DEGREES
EKG VENTRICULAR RATE: 92 BPM
EOSINOPHIL # BLD: 0.1 K/UL (ref 0–0.6)
EOSINOPHIL NFR BLD: 0.9 %
EPI CELLS #/AREA URNS AUTO: 3 /HPF (ref 0–5)
EST. AVERAGE GLUCOSE BLD GHB EST-MCNC: 99.7 MG/DL
GFR SERPLBLD CREATININE-BSD FMLA CKD-EPI: >90 ML/MIN/{1.73_M2}
GLUCOSE SERPL-MCNC: 113 MG/DL (ref 70–99)
GLUCOSE UR STRIP.AUTO-MCNC: NEGATIVE MG/DL
HBA1C MFR BLD: 5.1 %
HCT VFR BLD AUTO: 33.9 % (ref 40.5–52.5)
HGB BLD-MCNC: 12.2 G/DL (ref 13.5–17.5)
HGB UR QL STRIP.AUTO: NEGATIVE
HYALINE CASTS #/AREA URNS AUTO: 0 /LPF (ref 0–8)
INR PPP: 1.22 (ref 0.85–1.15)
KETONES UR STRIP.AUTO-MCNC: ABNORMAL MG/DL
LEUKOCYTE ESTERASE UR QL STRIP.AUTO: ABNORMAL
LYMPHOCYTES # BLD: 0.7 K/UL (ref 1–5.1)
LYMPHOCYTES NFR BLD: 6.9 %
MCH RBC QN AUTO: 34.8 PG (ref 26–34)
MCHC RBC AUTO-ENTMCNC: 36 G/DL (ref 31–36)
MCV RBC AUTO: 96.5 FL (ref 80–100)
MONOCYTES # BLD: 0.9 K/UL (ref 0–1.3)
MONOCYTES NFR BLD: 9.4 %
NEUTROPHILS # BLD: 8.2 K/UL (ref 1.7–7.7)
NEUTROPHILS NFR BLD: 82.4 %
NITRITE UR QL STRIP.AUTO: NEGATIVE
PH UR STRIP.AUTO: 6.5 [PH] (ref 5–8)
PLATELET # BLD AUTO: 250 K/UL (ref 135–450)
PMV BLD AUTO: 7.3 FL (ref 5–10.5)
POTASSIUM SERPL-SCNC: 4.1 MMOL/L (ref 3.5–5.1)
PROT SERPL-MCNC: 7.1 G/DL (ref 6.4–8.2)
PROT UR STRIP.AUTO-MCNC: 30 MG/DL
PROTHROMBIN TIME: 15.6 SEC (ref 11.9–14.9)
RBC # BLD AUTO: 3.51 M/UL (ref 4.2–5.9)
RBC CLUMPS #/AREA URNS AUTO: 3 /HPF (ref 0–4)
SODIUM SERPL-SCNC: 124 MMOL/L (ref 136–145)
SP GR UR STRIP.AUTO: 1.01 (ref 1–1.03)
UA DIPSTICK W REFLEX MICRO PNL UR: YES
URN SPEC COLLECT METH UR: ABNORMAL
UROBILINOGEN UR STRIP-ACNC: 2 E.U./DL
WBC # BLD AUTO: 9.9 K/UL (ref 4–11)
WBC #/AREA URNS AUTO: 2 /HPF (ref 0–5)

## 2025-01-07 PROCEDURE — 80053 COMPREHEN METABOLIC PANEL: CPT

## 2025-01-07 PROCEDURE — 83036 HEMOGLOBIN GLYCOSYLATED A1C: CPT

## 2025-01-07 PROCEDURE — 87081 CULTURE SCREEN ONLY: CPT

## 2025-01-07 PROCEDURE — 85610 PROTHROMBIN TIME: CPT

## 2025-01-07 PROCEDURE — 85730 THROMBOPLASTIN TIME PARTIAL: CPT

## 2025-01-07 PROCEDURE — 86900 BLOOD TYPING SEROLOGIC ABO: CPT

## 2025-01-07 PROCEDURE — 86850 RBC ANTIBODY SCREEN: CPT

## 2025-01-07 PROCEDURE — 87205 SMEAR GRAM STAIN: CPT

## 2025-01-07 PROCEDURE — 87086 URINE CULTURE/COLONY COUNT: CPT

## 2025-01-07 PROCEDURE — 36415 COLL VENOUS BLD VENIPUNCTURE: CPT

## 2025-01-07 PROCEDURE — 86901 BLOOD TYPING SEROLOGIC RH(D): CPT

## 2025-01-07 PROCEDURE — 85025 COMPLETE CBC W/AUTO DIFF WBC: CPT

## 2025-01-07 PROCEDURE — 87075 CULTR BACTERIA EXCEPT BLOOD: CPT

## 2025-01-07 PROCEDURE — 87077 CULTURE AEROBIC IDENTIFY: CPT

## 2025-01-07 PROCEDURE — 87186 SC STD MICRODIL/AGAR DIL: CPT

## 2025-01-07 PROCEDURE — 81001 URINALYSIS AUTO W/SCOPE: CPT

## 2025-01-07 PROCEDURE — 87070 CULTURE OTHR SPECIMN AEROBIC: CPT

## 2025-01-07 RX ORDER — IBUPROFEN 200 MG
200 TABLET ORAL EVERY 6 HOURS PRN
COMMUNITY

## 2025-01-07 NOTE — PROGRESS NOTES
DATE _1/9/25__      PLACE __x_ [address]                          TIME _1516__                                             ___ [address]                           Arrival 1130 per office___                                                                                                                                                                                                        Surgeons office to give arrival time _____                                                                                                 If you have not received time contact your surgeon.                                                                                                                              Surgery dates,times and arrival times are subject to change.Please check with your surgeon.  Bring a photo I.D.,insurance card and form of payment if you have a co pay.  Plan for someone 18 years or older to stay on site while you are her and to take you home after surgery.You are not permitted to drive yourself home. You cannot leave via Uber, Lyft, Taxi or Medical Transport by yourself. You must have someone with you. It is strongly suggested that someone stay with you the first 24 hours after anesthesia. Your surgery will be cancelled if you do not have a ride home. A parent or legal guardian guardian must stay with a child until the child is discharged. Please do not bring other children.  A History/Physical is required to be completed and dated within 30 days of your surgery. To be done by PCP _x_ Surgeon ___or Hospitalist ___  You may be required to get labs _x_ EKG _x_ or a chest Xray ___ prior to surgery. To be done by _1/7___  Nothing to eat or drink after midnight the evening prior to surgery.  If your surgeon requires a bowel prep, follow their instructions.  You may brush your teeth.  Bring a complete list of your medications including vitamins and supplement with the name,dose and frequency.  Take the  following medications with a sip of water the AM of surgery _____metoprolol_______________________________   DR [facility] patients do not take any medications the AM of surgery.  If you can not be reached by phone to give specific medication instructions,you may use your inhalers,take your heart, blood pressure,seizure and thyroid medications with a sip of water the AM of surgery. Bring your rescue inhaler with you if you use one.  DO NOT take blood pressure medications ending in \"pril\"or \"sartan\" the evening prior or morning of surgery (such as Lisinopril or Losartan).  For blood thinners such as Plavix, Eliquis, Effient, Pradaxa, Xarelto or antiplatelets such as Pletal get instructions on if you need to stop prior to surgery and for how long.  Has instructions ___  to get instructions from surgeon and prescribing doctor and surgeon ___.  Aspirin,Ibuprofen,Advil,Aleve and any anti inflammatories along with vitamins should be stopped 5-7 days before surgery or as directed by surgeon. Tylenol is okay to take until the evening prior to surgery.  If you take a long-acting insulin at night,cut the dose in half the evening prior to surgery.  If you take a GLP-1 receptor (such as Trulicity,Mounjaro, Ozempic weekly),do not take 7 days prior to surgery. If you take a daily dose such as Rybelsus you must stop at least 24 hours prior to surgery.  If your blood sugar is low and you are symptomatic the AM of surgery you may take sips of a sugary clear liquid.  If you take any medication for weight loss(such as Adipex Naltrexone,Phentermine) you should not take for a minium of 72 hours prior to surgery.  Follow your surgeons instructions on showering prior to surgery.If you were not given specific instructions ,shower with an antibacterial soap such as dial the morning of surgery.  Wear clean loose fitting clothing.  Remove all piercings,rings and jewelry and leave at home.  Glasses,hearing aides and contacts will be removed prior

## 2025-01-08 ENCOUNTER — ANESTHESIA EVENT (OUTPATIENT)
Dept: OPERATING ROOM | Age: 65
End: 2025-01-08
Payer: COMMERCIAL

## 2025-01-08 LAB — BACTERIA UR CULT: NORMAL

## 2025-01-08 ASSESSMENT — LIFESTYLE VARIABLES: SMOKING_STATUS: 0

## 2025-01-08 NOTE — PROGRESS NOTES
Reviewed with Dr. Anne pt's sodium (124) done 1/7/25 - order received to do BMP day of surgery. Also notified Deann @ Dr. Ricketts's office.

## 2025-01-08 NOTE — ANESTHESIA PRE PROCEDURE
No results for input(s): \"POCGLU\", \"POCNA\", \"POCK\", \"POCCL\", \"POCBUN\", \"POCHEMO\", \"POCHCT\" in the last 72 hours.    Coags:   Lab Results   Component Value Date/Time    PROTIME 15.6 01/07/2025 11:35 AM    INR 1.22 01/07/2025 11:35 AM    APTT 35.2 01/07/2025 11:35 AM       HCG (If Applicable): No results found for: \"PREGTESTUR\", \"PREGSERUM\", \"HCG\", \"HCGQUANT\"     ABGs: No results found for: \"PHART\", \"PO2ART\", \"FRS4IEG\", \"KFD6KPQ\", \"BEART\", \"D6HSAXZQ\"     Type & Screen (If Applicable):  Lab Results   Component Value Date    ABORH B POS 01/07/2025    LABANTI NEG 01/07/2025       Drug/Infectious Status (If Applicable):  No results found for: \"HIV\", \"HEPCAB\"    COVID-19 Screening (If Applicable): No results found for: \"COVID19\"        Anesthesia Evaluation  Patient summary reviewed and Nursing notes reviewed   no history of anesthetic complications:   Airway: Mallampati: III  TM distance: >3 FB   Neck ROM: full  Mouth opening: > = 3 FB   Dental: normal exam         Pulmonary:Negative Pulmonary ROS and normal exam  breath sounds clear to auscultation      (-) COPD, asthma, sleep apnea and not a current smoker                           Cardiovascular:    (+) hypertension:, hyperlipidemia    (-) past MI, CAD, CABG/stent, dysrhythmias,  angina and  CHF    ECG reviewed  Rhythm: regular  Rate: normal           Beta Blocker:  Dose within 24 Hrs         Neuro/Psych:   Negative Neuro/Psych ROS     (-) seizures, TIA and CVA           GI/Hepatic/Renal:   (+) morbid obesity     (-) GERD, liver disease and no renal disease       Endo/Other:    (+) : arthritis: OA., electrolyte abnormalities.    (-) diabetes mellitus, hypothyroidism, hyperthyroidism               Abdominal:             Vascular:          Other Findings:             Anesthesia Plan      general and regional     ASA 3     (Consented for GA and adductor canal block, risks/benefits discussed including nerve damage, bleeding, infection, and local anesthetic toxicity,

## 2025-01-08 NOTE — DISCHARGE INSTRUCTIONS
prior to surgery should resume their pre op dose  and discontinue lovenox when INR = 2.0                   Call office with any questions  [phone]  Follow up appt  with ortho in 1 WEEK  ELECTRONICALLY SIGNED BY: Hudson Ricketts MD, Hudson Ricketts MD, 1/9/2025

## 2025-01-09 ENCOUNTER — HOSPITAL ENCOUNTER (OUTPATIENT)
Age: 65
LOS: 1 days | Discharge: HOME HEALTH | End: 2025-01-10
Attending: ORTHOPAEDIC SURGERY | Admitting: ORTHOPAEDIC SURGERY
Payer: COMMERCIAL

## 2025-01-09 ENCOUNTER — ANESTHESIA (OUTPATIENT)
Dept: OPERATING ROOM | Age: 65
End: 2025-01-09
Payer: COMMERCIAL

## 2025-01-09 DIAGNOSIS — M70.41 PREPATELLAR BURSITIS OF RIGHT KNEE: Primary | ICD-10-CM

## 2025-01-09 LAB
ABO + RH BLD: NORMAL
ANION GAP SERPL CALCULATED.3IONS-SCNC: 12 MMOL/L (ref 3–16)
BLD GP AB SCN SERPL QL: NORMAL
BUN SERPL-MCNC: 10 MG/DL (ref 7–20)
CALCIUM SERPL-MCNC: 9.2 MG/DL (ref 8.3–10.6)
CHLORIDE SERPL-SCNC: 95 MMOL/L (ref 99–110)
CO2 SERPL-SCNC: 24 MMOL/L (ref 21–32)
CREAT SERPL-MCNC: 0.8 MG/DL (ref 0.8–1.3)
GFR SERPLBLD CREATININE-BSD FMLA CKD-EPI: >90 ML/MIN/{1.73_M2}
GLUCOSE SERPL-MCNC: 103 MG/DL (ref 70–99)
MRSA SPEC QL CULT: NORMAL
POTASSIUM SERPL-SCNC: 4.3 MMOL/L (ref 3.5–5.1)
SODIUM SERPL-SCNC: 131 MMOL/L (ref 136–145)

## 2025-01-09 PROCEDURE — 86901 BLOOD TYPING SEROLOGIC RH(D): CPT

## 2025-01-09 PROCEDURE — 6370000000 HC RX 637 (ALT 250 FOR IP): Performed by: ORTHOPAEDIC SURGERY

## 2025-01-09 PROCEDURE — 6360000002 HC RX W HCPCS: Performed by: ORTHOPAEDIC SURGERY

## 2025-01-09 PROCEDURE — 6370000000 HC RX 637 (ALT 250 FOR IP): Performed by: PHYSICIAN ASSISTANT

## 2025-01-09 PROCEDURE — 80048 BASIC METABOLIC PNL TOTAL CA: CPT

## 2025-01-09 PROCEDURE — 64447 NJX AA&/STRD FEMORAL NRV IMG: CPT | Performed by: ANESTHESIOLOGY

## 2025-01-09 PROCEDURE — 2500000003 HC RX 250 WO HCPCS: Performed by: ORTHOPAEDIC SURGERY

## 2025-01-09 PROCEDURE — 86850 RBC ANTIBODY SCREEN: CPT

## 2025-01-09 PROCEDURE — 3700000000 HC ANESTHESIA ATTENDED CARE: Performed by: ORTHOPAEDIC SURGERY

## 2025-01-09 PROCEDURE — 36415 COLL VENOUS BLD VENIPUNCTURE: CPT

## 2025-01-09 PROCEDURE — 3600000004 HC SURGERY LEVEL 4 BASE: Performed by: ORTHOPAEDIC SURGERY

## 2025-01-09 PROCEDURE — 2720000010 HC SURG SUPPLY STERILE: Performed by: ORTHOPAEDIC SURGERY

## 2025-01-09 PROCEDURE — 3600000014 HC SURGERY LEVEL 4 ADDTL 15MIN: Performed by: ORTHOPAEDIC SURGERY

## 2025-01-09 PROCEDURE — 7100000000 HC PACU RECOVERY - FIRST 15 MIN: Performed by: ORTHOPAEDIC SURGERY

## 2025-01-09 PROCEDURE — 2580000003 HC RX 258: Performed by: ORTHOPAEDIC SURGERY

## 2025-01-09 PROCEDURE — 6360000002 HC RX W HCPCS: Performed by: ANESTHESIOLOGY

## 2025-01-09 PROCEDURE — 86900 BLOOD TYPING SEROLOGIC ABO: CPT

## 2025-01-09 PROCEDURE — 2709999900 HC NON-CHARGEABLE SUPPLY: Performed by: ORTHOPAEDIC SURGERY

## 2025-01-09 PROCEDURE — 3700000001 HC ADD 15 MINUTES (ANESTHESIA): Performed by: ORTHOPAEDIC SURGERY

## 2025-01-09 PROCEDURE — 2500000003 HC RX 250 WO HCPCS: Performed by: NURSE ANESTHETIST, CERTIFIED REGISTERED

## 2025-01-09 PROCEDURE — 7100000001 HC PACU RECOVERY - ADDTL 15 MIN: Performed by: ORTHOPAEDIC SURGERY

## 2025-01-09 PROCEDURE — 6360000002 HC RX W HCPCS: Performed by: NURSE ANESTHETIST, CERTIFIED REGISTERED

## 2025-01-09 RX ORDER — SODIUM CHLORIDE 9 MG/ML
INJECTION, SOLUTION INTRAVENOUS PRN
Status: DISCONTINUED | OUTPATIENT
Start: 2025-01-09 | End: 2025-01-10 | Stop reason: HOSPADM

## 2025-01-09 RX ORDER — SODIUM CHLORIDE 9 MG/ML
INJECTION, SOLUTION INTRAVENOUS CONTINUOUS
Status: DISCONTINUED | OUTPATIENT
Start: 2025-01-09 | End: 2025-01-10 | Stop reason: HOSPADM

## 2025-01-09 RX ORDER — ASPIRIN 81 MG/1
81 TABLET ORAL DAILY
Qty: 30 TABLET | Refills: 0 | Status: SHIPPED | OUTPATIENT
Start: 2025-01-09

## 2025-01-09 RX ORDER — MAGNESIUM HYDROXIDE 1200 MG/15ML
LIQUID ORAL CONTINUOUS PRN
Status: COMPLETED | OUTPATIENT
Start: 2025-01-09 | End: 2025-01-09

## 2025-01-09 RX ORDER — HYDROMORPHONE HYDROCHLORIDE 2 MG/ML
0.25 INJECTION, SOLUTION INTRAMUSCULAR; INTRAVENOUS; SUBCUTANEOUS
Status: DISCONTINUED | OUTPATIENT
Start: 2025-01-09 | End: 2025-01-10 | Stop reason: HOSPADM

## 2025-01-09 RX ORDER — SODIUM CHLORIDE 0.9 % (FLUSH) 0.9 %
5-40 SYRINGE (ML) INJECTION PRN
Status: DISCONTINUED | OUTPATIENT
Start: 2025-01-09 | End: 2025-01-10 | Stop reason: HOSPADM

## 2025-01-09 RX ORDER — ONDANSETRON 2 MG/ML
4 INJECTION INTRAMUSCULAR; INTRAVENOUS EVERY 6 HOURS PRN
Status: DISCONTINUED | OUTPATIENT
Start: 2025-01-09 | End: 2025-01-10 | Stop reason: HOSPADM

## 2025-01-09 RX ORDER — HYDROMORPHONE HYDROCHLORIDE 2 MG/ML
0.5 INJECTION, SOLUTION INTRAMUSCULAR; INTRAVENOUS; SUBCUTANEOUS
Status: DISCONTINUED | OUTPATIENT
Start: 2025-01-09 | End: 2025-01-10 | Stop reason: HOSPADM

## 2025-01-09 RX ORDER — SODIUM CHLORIDE 0.9 % (FLUSH) 0.9 %
5-40 SYRINGE (ML) INJECTION EVERY 12 HOURS SCHEDULED
Status: DISCONTINUED | OUTPATIENT
Start: 2025-01-09 | End: 2025-01-10 | Stop reason: HOSPADM

## 2025-01-09 RX ORDER — HYDROMORPHONE HYDROCHLORIDE 2 MG/ML
INJECTION, SOLUTION INTRAMUSCULAR; INTRAVENOUS; SUBCUTANEOUS
Status: DISCONTINUED | OUTPATIENT
Start: 2025-01-09 | End: 2025-01-09 | Stop reason: SDUPTHER

## 2025-01-09 RX ORDER — ACETAMINOPHEN 500 MG
1000 TABLET ORAL 3 TIMES DAILY
COMMUNITY
Start: 2025-01-09

## 2025-01-09 RX ORDER — LIDOCAINE HYDROCHLORIDE 20 MG/ML
INJECTION, SOLUTION INFILTRATION; PERINEURAL
Status: DISCONTINUED | OUTPATIENT
Start: 2025-01-09 | End: 2025-01-09 | Stop reason: SDUPTHER

## 2025-01-09 RX ORDER — SUCCINYLCHOLINE/SOD CL,ISO/PF 100 MG/5ML
SYRINGE (ML) INTRAVENOUS
Status: DISCONTINUED | OUTPATIENT
Start: 2025-01-09 | End: 2025-01-09 | Stop reason: SDUPTHER

## 2025-01-09 RX ORDER — BISACODYL 10 MG
10 SUPPOSITORY, RECTAL RECTAL DAILY PRN
Status: DISCONTINUED | OUTPATIENT
Start: 2025-01-09 | End: 2025-01-10 | Stop reason: HOSPADM

## 2025-01-09 RX ORDER — SODIUM CHLORIDE 9 MG/ML
INJECTION, SOLUTION INTRAVENOUS CONTINUOUS
Status: DISCONTINUED | OUTPATIENT
Start: 2025-01-09 | End: 2025-01-09 | Stop reason: HOSPADM

## 2025-01-09 RX ORDER — PROPOFOL 10 MG/ML
INJECTION, EMULSION INTRAVENOUS
Status: DISCONTINUED | OUTPATIENT
Start: 2025-01-09 | End: 2025-01-09 | Stop reason: SDUPTHER

## 2025-01-09 RX ORDER — DEXAMETHASONE SODIUM PHOSPHATE 10 MG/ML
10 INJECTION, SOLUTION INTRAMUSCULAR; INTRAVENOUS ONCE
Status: COMPLETED | OUTPATIENT
Start: 2025-01-09 | End: 2025-01-09

## 2025-01-09 RX ORDER — TRANEXAMIC ACID 10 MG/ML
1000 INJECTION, SOLUTION INTRAVENOUS ONCE
Status: COMPLETED | OUTPATIENT
Start: 2025-01-09 | End: 2025-01-09

## 2025-01-09 RX ORDER — OXYCODONE HYDROCHLORIDE 5 MG/1
5 TABLET ORAL EVERY 6 HOURS PRN
Qty: 28 TABLET | Refills: 0 | Status: SHIPPED | OUTPATIENT
Start: 2025-01-09 | End: 2025-01-16

## 2025-01-09 RX ORDER — LIDOCAINE HYDROCHLORIDE 10 MG/ML
0.5 INJECTION, SOLUTION EPIDURAL; INFILTRATION; INTRACAUDAL; PERINEURAL ONCE
Status: DISCONTINUED | OUTPATIENT
Start: 2025-01-09 | End: 2025-01-09 | Stop reason: HOSPADM

## 2025-01-09 RX ORDER — BUPIVACAINE HYDROCHLORIDE 5 MG/ML
INJECTION, SOLUTION EPIDURAL; INTRACAUDAL
Status: COMPLETED | OUTPATIENT
Start: 2025-01-09 | End: 2025-01-09

## 2025-01-09 RX ORDER — ONDANSETRON 4 MG/1
4 TABLET, ORALLY DISINTEGRATING ORAL EVERY 8 HOURS PRN
Status: DISCONTINUED | OUTPATIENT
Start: 2025-01-09 | End: 2025-01-10 | Stop reason: HOSPADM

## 2025-01-09 RX ORDER — ACETAMINOPHEN 500 MG
1000 TABLET ORAL EVERY 8 HOURS SCHEDULED
Status: DISCONTINUED | OUTPATIENT
Start: 2025-01-09 | End: 2025-01-10 | Stop reason: HOSPADM

## 2025-01-09 RX ORDER — DOCUSATE SODIUM 100 MG/1
100 CAPSULE, LIQUID FILLED ORAL 2 TIMES DAILY
COMMUNITY
Start: 2025-01-09

## 2025-01-09 RX ORDER — ROCURONIUM BROMIDE 10 MG/ML
INJECTION, SOLUTION INTRAVENOUS
Status: DISCONTINUED | OUTPATIENT
Start: 2025-01-09 | End: 2025-01-09 | Stop reason: SDUPTHER

## 2025-01-09 RX ORDER — OXYCODONE HCL 20 MG/1
20 TABLET, FILM COATED, EXTENDED RELEASE ORAL ONCE
Status: COMPLETED | OUTPATIENT
Start: 2025-01-09 | End: 2025-01-09

## 2025-01-09 RX ORDER — ASPIRIN 81 MG/1
81 TABLET ORAL DAILY
Status: DISCONTINUED | OUTPATIENT
Start: 2025-01-09 | End: 2025-01-10 | Stop reason: HOSPADM

## 2025-01-09 RX ORDER — TRANEXAMIC ACID 10 MG/ML
1000 INJECTION, SOLUTION INTRAVENOUS
Status: DISCONTINUED | OUTPATIENT
Start: 2025-01-09 | End: 2025-01-09 | Stop reason: HOSPADM

## 2025-01-09 RX ORDER — MUPIROCIN 20 MG/G
OINTMENT TOPICAL 2 TIMES DAILY
Status: DISCONTINUED | OUTPATIENT
Start: 2025-01-09 | End: 2025-01-10 | Stop reason: HOSPADM

## 2025-01-09 RX ORDER — OXYCODONE HYDROCHLORIDE 5 MG/1
5 TABLET ORAL EVERY 4 HOURS PRN
Status: DISCONTINUED | OUTPATIENT
Start: 2025-01-09 | End: 2025-01-10 | Stop reason: HOSPADM

## 2025-01-09 RX ORDER — OXYCODONE HYDROCHLORIDE 5 MG/1
10 TABLET ORAL EVERY 4 HOURS PRN
Status: DISCONTINUED | OUTPATIENT
Start: 2025-01-09 | End: 2025-01-10 | Stop reason: HOSPADM

## 2025-01-09 RX ADMIN — ROCURONIUM BROMIDE 20 MG: 10 INJECTION, SOLUTION INTRAVENOUS at 13:10

## 2025-01-09 RX ADMIN — SODIUM CHLORIDE 3000 MG: 900 INJECTION INTRAVENOUS at 13:04

## 2025-01-09 RX ADMIN — HYDROMORPHONE HYDROCHLORIDE 1 MG: 2 INJECTION, SOLUTION INTRAMUSCULAR; INTRAVENOUS; SUBCUTANEOUS at 13:55

## 2025-01-09 RX ADMIN — ROCURONIUM BROMIDE 10 MG: 10 INJECTION, SOLUTION INTRAVENOUS at 13:07

## 2025-01-09 RX ADMIN — DEXAMETHASONE SODIUM PHOSPHATE 10 MG: 10 INJECTION INTRAMUSCULAR; INTRAVENOUS at 12:23

## 2025-01-09 RX ADMIN — ROCURONIUM BROMIDE 20 MG: 10 INJECTION, SOLUTION INTRAVENOUS at 13:26

## 2025-01-09 RX ADMIN — HYDROMORPHONE HYDROCHLORIDE 0.6 MG: 2 INJECTION, SOLUTION INTRAMUSCULAR; INTRAVENOUS; SUBCUTANEOUS at 13:07

## 2025-01-09 RX ADMIN — Medication 25 MG: at 13:26

## 2025-01-09 RX ADMIN — SODIUM CHLORIDE: 9 INJECTION, SOLUTION INTRAVENOUS at 12:59

## 2025-01-09 RX ADMIN — ACETAMINOPHEN 1000 MG: 500 TABLET ORAL at 21:43

## 2025-01-09 RX ADMIN — HYDROMORPHONE HYDROCHLORIDE 0.4 MG: 2 INJECTION, SOLUTION INTRAMUSCULAR; INTRAVENOUS; SUBCUTANEOUS at 13:26

## 2025-01-09 RX ADMIN — Medication 25 MG: at 13:07

## 2025-01-09 RX ADMIN — BUPIVACAINE HYDROCHLORIDE 20 ML: 5 INJECTION, SOLUTION EPIDURAL; INTRACAUDAL at 12:41

## 2025-01-09 RX ADMIN — Medication 160 MG: at 13:07

## 2025-01-09 RX ADMIN — ASPIRIN 81 MG: 81 TABLET, COATED ORAL at 17:58

## 2025-01-09 RX ADMIN — MUPIROCIN: 20 OINTMENT TOPICAL at 21:43

## 2025-01-09 RX ADMIN — TRANEXAMIC ACID 1000 MG: 10 INJECTION, SOLUTION INTRAVENOUS at 13:11

## 2025-01-09 RX ADMIN — SUGAMMADEX 200 MG: 100 INJECTION, SOLUTION INTRAVENOUS at 13:57

## 2025-01-09 RX ADMIN — OXYCODONE HYDROCHLORIDE 20 MG: 20 TABLET, FILM COATED, EXTENDED RELEASE ORAL at 11:57

## 2025-01-09 RX ADMIN — LIDOCAINE HYDROCHLORIDE 100 MG: 20 INJECTION, SOLUTION INFILTRATION; PERINEURAL at 13:07

## 2025-01-09 RX ADMIN — SODIUM CHLORIDE: 9 INJECTION, SOLUTION INTRAVENOUS at 17:59

## 2025-01-09 RX ADMIN — VANCOMYCIN HYDROCHLORIDE 2000 MG: 1 INJECTION, POWDER, LYOPHILIZED, FOR SOLUTION INTRAVENOUS at 13:42

## 2025-01-09 RX ADMIN — PROPOFOL 200 MG: 10 INJECTION, EMULSION INTRAVENOUS at 13:07

## 2025-01-09 ASSESSMENT — PAIN SCALES - GENERAL
PAINLEVEL_OUTOF10: 2
PAINLEVEL_OUTOF10: 4

## 2025-01-09 ASSESSMENT — PAIN - FUNCTIONAL ASSESSMENT: PAIN_FUNCTIONAL_ASSESSMENT: 0-10

## 2025-01-09 ASSESSMENT — PAIN DESCRIPTION - LOCATION: LOCATION: KNEE

## 2025-01-09 ASSESSMENT — PAIN DESCRIPTION - ORIENTATION: ORIENTATION: RIGHT

## 2025-01-09 ASSESSMENT — PAIN DESCRIPTION - DESCRIPTORS: DESCRIPTORS: SORE

## 2025-01-09 NOTE — H&P
Date of Surgery Update:      Jaden Tellez  was seen, history and physical examination reviewed, and patient examined by me today. There have been no significant clinical changes since the completion of the previous history and physical.    The patient and I discussed that this is an elective procedure/surgery. We discussed the risks of the procedure/surgery, including but not limited to what is outlined in the signed informed consent. We also discussed the risk of ruba COVID 19 while in the facility. We discussed the increased risk of a bad outcome should the patient contract COVID 19 during the post-procedural/post-operative period, given the patient’s current health condition, chronic conditions, and the added risk of COVID 19 in light of these conditions. The patient and I also discussed the risk of further postponing the procedure/surgery and other treatment alternatives, including non-procedural/surgical treatments.       The risk, benefits, and alternatives of the proposed procedure have been explained to the patient (or appropriate guardian) and understanding verbalized. All questions answered. Patient wishes to proceed.        Electronically signed by: Hudson Ricketts MD,1/9/2025,12:30 PM

## 2025-01-09 NOTE — BRIEF OP NOTE
Brief Postoperative Note      Patient: Jaden Tellez  YOB: 1960  MRN: [id number]    Date of Procedure: 1/9/2025    Pre-Op Diagnosis Codes:      * Presence of right artificial knee joint [Z96.651]     * Other bursitis of knee, right knee [M70.51]    Post-Op Diagnosis: Same       Procedure(s):  RIGHT KNEE IRRIGATION AND DEBRIDMENT infectious pre patella bursa     Surgeon(s):  Hudson Ricketts MD    Assistant:  Surgical Assistant: Shakeel Patton  Physician Assistant: Bethel Astudillo PA    Anesthesia: General    Estimated Blood Loss (mL): less than 50     Complications: None    Specimens:   * No specimens in log *    Implants:  * No implants in log *      Drains: * No LDAs found *    Findings:  Infection Present At Time Of Surgery (PATOS) (choose all levels that have infection present):  - Superficial Infection (skin/subcutaneous) present as evidenced by fluid consistent with infection  Other Findings: infectious pre patella bursitis, no communication with the joint    Electronically signed by Hudson Ricketts MD on 1/9/2025 at 1:59 PM

## 2025-01-09 NOTE — ANESTHESIA POSTPROCEDURE EVALUATION
Department of Anesthesiology  Postprocedure Note    Patient: Jaden Tellez  MRN: [id number]  YOB: 1960  Date of evaluation: 1/9/2025    Procedure Summary       Date: 01/09/25 Room / Location: [address]    Anesthesia Start: 1259 Anesthesia Stop: 1412    Procedure: RIGHT KNEE IRRIGATION AND DEBRIDMENT (Right) Diagnosis:       Presence of right artificial knee joint      Other bursitis of knee, right knee      (Presence of right artificial knee joint [Z96.651])      (Other bursitis of knee, right knee [M70.51])    Surgeons: Hudson Ricketts MD Responsible Provider: Michael Anne MD    Anesthesia Type: general, regional ASA Status: 3            Anesthesia Type: No value filed.    Rosi Phase I: Rosi Score: 8    Rosi Phase II:      Anesthesia Post Evaluation    Patient location during evaluation: PACU  Patient participation: complete - patient participated  Level of consciousness: awake and alert  Airway patency: patent  Nausea & Vomiting: no vomiting and no nausea  Cardiovascular status: hemodynamically stable  Respiratory status: acceptable  Hydration status: stable  Pain management: adequate    No notable events documented.

## 2025-01-09 NOTE — PROGRESS NOTES
Pt resting quietly in bed, awake, denies pain medication at this time and states pain in right leg is tolerable. VSS, O2 sats 100% on 2 L NC. Dressing to right knee dry and intact, neurovascular assessment WDL. Knee immobilizer in place. Pt seen by anesthesia, phase 1 criteria met, family at bedside. Awaiting bed placement on 4T. Will monitor until room available.

## 2025-01-09 NOTE — PROGRESS NOTES
Room clean on 4T. Pt resting quietly in bed with eyes closed, awakens easily to voice. Will transfer.

## 2025-01-09 NOTE — ANESTHESIA PROCEDURE NOTES
Peripheral Block    Patient location during procedure: pre-op  Reason for block: post-op pain management and at surgeon's request  Start time: 1/9/2025 12:41 PM  End time: 1/9/2025 12:45 PM  Staffing  Performed: resident/CRNA   Anesthesiologist: Michael Anne MD  Resident/CRNA: Luz Hankins APRN - CRNA  Performed by: Michael Anne MD  Authorized by: Michael Anne MD    Preanesthetic Checklist  Completed: patient identified, IV checked, site marked, risks and benefits discussed, surgical/procedural consents, equipment checked, pre-op evaluation, timeout performed, anesthesia consent given, oxygen available and monitors applied/VS acknowledged  Peripheral Block   Patient position: supine  Prep: ChloraPrep  Provider prep: mask and sterile gloves  Patient monitoring: cardiac monitor, continuous pulse ox, frequent blood pressure checks, IV access, oxygen and responsive to questions  Block type: Femoral  Adductor canal  Laterality: right  Injection technique: single-shot  Guidance: ultrasound guided  Local infiltration: lidocaine  Infiltration strength: 1 %  Local infiltration: lidocaine  Dose: 1 mL    Needle   Needle type: insulated echogenic nerve stimulator needle   Needle gauge: 20 G  Needle localization: ultrasound guidance and anatomical landmarks  Needle length: 10 cm  Assessment   Injection assessment: negative aspiration for heme, no paresthesia on injection, local visualized surrounding nerve on ultrasound and no intravascular symptoms  Paresthesia pain: none  Slow fractionated injection: yes  Hemodynamics: stable  Outcomes: uncomplicated and patient tolerated procedure well    Additional Notes  U/S [id number].  (1) Under ultrasound guidance, a 20 gauge needle was inserted and placed in close proximity to the ac nerve.  (2) Ultrasound was also used to visualize the spread of the anesthetic in close proximity to the nerve being blocked. (3) The nerve appeared anatomically normal, and (4

## 2025-01-09 NOTE — PROGRESS NOTES
Pt arrived from OR to PACU, awakens to voice, states pain in right knee is tolerable at this time. VSS, O2 sats 97% on 3 L NC. Dressing to right knee dry and intact, neurovascular assessment WDL. Will monitor.

## 2025-01-10 VITALS
HEART RATE: 95 BPM | RESPIRATION RATE: 16 BRPM | HEIGHT: 70 IN | WEIGHT: 274 LBS | TEMPERATURE: 97.9 F | SYSTOLIC BLOOD PRESSURE: 168 MMHG | DIASTOLIC BLOOD PRESSURE: 84 MMHG | BODY MASS INDEX: 39.22 KG/M2 | OXYGEN SATURATION: 97 %

## 2025-01-10 LAB
ANION GAP SERPL CALCULATED.3IONS-SCNC: 12 MMOL/L (ref 3–16)
BASOPHILS # BLD: 0.1 K/UL (ref 0–0.2)
BASOPHILS NFR BLD: 0.9 %
BUN SERPL-MCNC: 10 MG/DL (ref 7–20)
CALCIUM SERPL-MCNC: 8.9 MG/DL (ref 8.3–10.6)
CHLORIDE SERPL-SCNC: 97 MMOL/L (ref 99–110)
CO2 SERPL-SCNC: 22 MMOL/L (ref 21–32)
CREAT SERPL-MCNC: 0.7 MG/DL (ref 0.8–1.3)
CRP SERPL-MCNC: 42.1 MG/L (ref 0–5.1)
DEPRECATED RDW RBC AUTO: 12.4 % (ref 12.4–15.4)
EOSINOPHIL # BLD: 0 K/UL (ref 0–0.6)
EOSINOPHIL NFR BLD: 0.1 %
ERYTHROCYTE [SEDIMENTATION RATE] IN BLOOD BY WESTERGREN METHOD: 83 MM/HR (ref 0–20)
GFR SERPLBLD CREATININE-BSD FMLA CKD-EPI: >90 ML/MIN/{1.73_M2}
GLUCOSE SERPL-MCNC: 103 MG/DL (ref 70–99)
HCT VFR BLD AUTO: 32.7 % (ref 40.5–52.5)
HGB BLD-MCNC: 11.9 G/DL (ref 13.5–17.5)
LYMPHOCYTES # BLD: 0.8 K/UL (ref 1–5.1)
LYMPHOCYTES NFR BLD: 9.9 %
MCH RBC QN AUTO: 34.2 PG (ref 26–34)
MCHC RBC AUTO-ENTMCNC: 36.4 G/DL (ref 31–36)
MCV RBC AUTO: 94.1 FL (ref 80–100)
MONOCYTES # BLD: 0.9 K/UL (ref 0–1.3)
MONOCYTES NFR BLD: 12.2 %
NEUTROPHILS # BLD: 6 K/UL (ref 1.7–7.7)
NEUTROPHILS NFR BLD: 76.9 %
PLATELET # BLD AUTO: 343 K/UL (ref 135–450)
PMV BLD AUTO: 6.4 FL (ref 5–10.5)
POTASSIUM SERPL-SCNC: 4 MMOL/L (ref 3.5–5.1)
RBC # BLD AUTO: 3.47 M/UL (ref 4.2–5.9)
SODIUM SERPL-SCNC: 131 MMOL/L (ref 136–145)
WBC # BLD AUTO: 7.8 K/UL (ref 4–11)

## 2025-01-10 PROCEDURE — 85025 COMPLETE CBC W/AUTO DIFF WBC: CPT

## 2025-01-10 PROCEDURE — 36415 COLL VENOUS BLD VENIPUNCTURE: CPT

## 2025-01-10 PROCEDURE — 97161 PT EVAL LOW COMPLEX 20 MIN: CPT

## 2025-01-10 PROCEDURE — 86140 C-REACTIVE PROTEIN: CPT

## 2025-01-10 PROCEDURE — 80048 BASIC METABOLIC PNL TOTAL CA: CPT

## 2025-01-10 PROCEDURE — 6370000000 HC RX 637 (ALT 250 FOR IP): Performed by: ORTHOPAEDIC SURGERY

## 2025-01-10 PROCEDURE — 6360000002 HC RX W HCPCS: Performed by: ORTHOPAEDIC SURGERY

## 2025-01-10 PROCEDURE — 99222 1ST HOSP IP/OBS MODERATE 55: CPT | Performed by: INTERNAL MEDICINE

## 2025-01-10 PROCEDURE — 6360000002 HC RX W HCPCS: Performed by: INTERNAL MEDICINE

## 2025-01-10 PROCEDURE — 2500000003 HC RX 250 WO HCPCS: Performed by: INTERNAL MEDICINE

## 2025-01-10 PROCEDURE — C1751 CATH, INF, PER/CENT/MIDLINE: HCPCS

## 2025-01-10 PROCEDURE — 36569 INSJ PICC 5 YR+ W/O IMAGING: CPT

## 2025-01-10 PROCEDURE — 85652 RBC SED RATE AUTOMATED: CPT

## 2025-01-10 PROCEDURE — 2580000003 HC RX 258: Performed by: ORTHOPAEDIC SURGERY

## 2025-01-10 PROCEDURE — 2500000003 HC RX 250 WO HCPCS: Performed by: ORTHOPAEDIC SURGERY

## 2025-01-10 PROCEDURE — 6370000000 HC RX 637 (ALT 250 FOR IP): Performed by: INTERNAL MEDICINE

## 2025-01-10 RX ORDER — ROSUVASTATIN CALCIUM 10 MG/1
10 TABLET, COATED ORAL NIGHTLY
Status: DISCONTINUED | OUTPATIENT
Start: 2025-01-10 | End: 2025-01-10 | Stop reason: HOSPADM

## 2025-01-10 RX ORDER — AMLODIPINE BESYLATE 5 MG/1
5 TABLET ORAL DAILY
Status: DISCONTINUED | OUTPATIENT
Start: 2025-01-10 | End: 2025-01-10 | Stop reason: HOSPADM

## 2025-01-10 RX ORDER — LIDOCAINE HYDROCHLORIDE 10 MG/ML
50 INJECTION, SOLUTION EPIDURAL; INFILTRATION; INTRACAUDAL; PERINEURAL ONCE
Status: DISCONTINUED | OUTPATIENT
Start: 2025-01-10 | End: 2025-01-10 | Stop reason: HOSPADM

## 2025-01-10 RX ORDER — AMLODIPINE AND BENAZEPRIL HYDROCHLORIDE 5; 40 MG/1; MG/1
1 CAPSULE ORAL DAILY
Status: DISCONTINUED | OUTPATIENT
Start: 2025-01-10 | End: 2025-01-10 | Stop reason: CLARIF

## 2025-01-10 RX ORDER — CLOTRIMAZOLE 1 %
CREAM (GRAM) TOPICAL
Qty: 30 G | Refills: 0 | Status: SHIPPED | OUTPATIENT
Start: 2025-01-10 | End: 2025-01-17

## 2025-01-10 RX ORDER — METOPROLOL SUCCINATE 50 MG/1
50 TABLET, EXTENDED RELEASE ORAL DAILY
Status: DISCONTINUED | OUTPATIENT
Start: 2025-01-10 | End: 2025-01-10 | Stop reason: HOSPADM

## 2025-01-10 RX ORDER — SODIUM CHLORIDE 0.9 % (FLUSH) 0.9 %
5-40 SYRINGE (ML) INJECTION PRN
Status: DISCONTINUED | OUTPATIENT
Start: 2025-01-10 | End: 2025-01-10 | Stop reason: HOSPADM

## 2025-01-10 RX ORDER — SODIUM CHLORIDE 0.9 % (FLUSH) 0.9 %
5-40 SYRINGE (ML) INJECTION EVERY 12 HOURS SCHEDULED
Status: DISCONTINUED | OUTPATIENT
Start: 2025-01-10 | End: 2025-01-10 | Stop reason: HOSPADM

## 2025-01-10 RX ORDER — SODIUM CHLORIDE 9 MG/ML
INJECTION, SOLUTION INTRAVENOUS PRN
Status: DISCONTINUED | OUTPATIENT
Start: 2025-01-10 | End: 2025-01-10 | Stop reason: HOSPADM

## 2025-01-10 RX ORDER — MICONAZOLE NITRATE 20 MG/G
CREAM TOPICAL 2 TIMES DAILY
Status: DISCONTINUED | OUTPATIENT
Start: 2025-01-10 | End: 2025-01-10 | Stop reason: HOSPADM

## 2025-01-10 RX ORDER — LISINOPRIL 20 MG/1
40 TABLET ORAL DAILY
Status: DISCONTINUED | OUTPATIENT
Start: 2025-01-10 | End: 2025-01-10 | Stop reason: HOSPADM

## 2025-01-10 RX ADMIN — VANCOMYCIN HYDROCHLORIDE 2000 MG: 1 INJECTION, POWDER, LYOPHILIZED, FOR SOLUTION INTRAVENOUS at 00:05

## 2025-01-10 RX ADMIN — MUPIROCIN: 20 OINTMENT TOPICAL at 08:35

## 2025-01-10 RX ADMIN — ACETAMINOPHEN 1000 MG: 500 TABLET ORAL at 18:45

## 2025-01-10 RX ADMIN — WATER 2000 MG: 1 INJECTION INTRAMUSCULAR; INTRAVENOUS; SUBCUTANEOUS at 18:47

## 2025-01-10 RX ADMIN — ASPIRIN 81 MG: 81 TABLET, COATED ORAL at 08:33

## 2025-01-10 RX ADMIN — SODIUM CHLORIDE, PRESERVATIVE FREE 10 ML: 5 INJECTION INTRAVENOUS at 00:02

## 2025-01-10 RX ADMIN — AMLODIPINE BESYLATE 5 MG: 5 TABLET ORAL at 08:33

## 2025-01-10 RX ADMIN — WATER 2000 MG: 1 INJECTION INTRAMUSCULAR; INTRAVENOUS; SUBCUTANEOUS at 13:13

## 2025-01-10 RX ADMIN — METOPROLOL SUCCINATE 50 MG: 50 TABLET, EXTENDED RELEASE ORAL at 08:33

## 2025-01-10 RX ADMIN — LISINOPRIL 40 MG: 20 TABLET ORAL at 08:34

## 2025-01-10 RX ADMIN — ACETAMINOPHEN 1000 MG: 500 TABLET ORAL at 05:18

## 2025-01-10 ASSESSMENT — PAIN DESCRIPTION - LOCATION
LOCATION: KNEE
LOCATION: LEG

## 2025-01-10 ASSESSMENT — PAIN DESCRIPTION - ORIENTATION
ORIENTATION: RIGHT
ORIENTATION: RIGHT

## 2025-01-10 ASSESSMENT — PAIN SCALES - GENERAL
PAINLEVEL_OUTOF10: 7
PAINLEVEL_OUTOF10: 2

## 2025-01-10 ASSESSMENT — PAIN DESCRIPTION - DESCRIPTORS: DESCRIPTORS: SORE

## 2025-01-10 NOTE — PROGRESS NOTES
tasks  Active :        [x] Yes  [] No  Hand Dominance: [] Left  [x] Right  Current Employment: full time employment.  Occupation: manage [employer]  Hobbies:   Recent Falls: Pt reports no recent falls.     Available Assistance at Discharge: 24 hr physical assistance available    Examination   Vision:   Vision Gross Assessment: WFL  Hearing:   WFL  Observation:   General Observation:  Pt seated in recliner with knee immobilizer in place on RLE.   Posture:   Good  Sensation:   denies numbness and tingling  Proprioception:    WFL  Tone:   Normotonic  ROM:   LLE WFL, R ankle and hip WFL, unable to assess R knee due to immobilizer  Strength:   LLE WFL, R hip and ankle WFL (unable to assess R knee due to immobilizer)  Therapist Clinical Decision Making (Complexity): low complexity  Clinical Presentation: stable      Subjective  General: Pt agreeable to PT eval.   Pain: 2/10.  Location: R knee  Pain Interventions: pain medication in place prior to arrival       Functional Mobility  Bed Mobility:  Bed mobility not completed on this date.  Comments: Pt seated in chair at beginning and end of session.   Transfers:  Sit to stand transfer: Independent  Stand to sit transfer: Independent  Comments:  Ambulation Trial 1:  Surface:level surface  Assistive Device: rolling walker  Assistance: modified independent  Distance: 150'  Gait Mechanics: appropriate jacquelyn, appropriate Perry, swing through gait pattern, slight hip hiking noted to R to clear R foot during swing phase  Comments:    Ambulation Trial 2:  Surface:level surface  Assistive Device: no device  Assistance: Independent  Distance: 20+30'+150'  Gait Mechanics: same as above  Comments:    Stair Mobility Trial 1:  Number of Steps: 4  Step Height: 6 inch  Hand Rails: (L) ascending handrail  Assistance: modified independent  Comments: Pt negotiated with appropriate sequencing, no LOB.   Stair Mobility Trial 2:  Number of Steps: 1  Step Height: 6 inch  Device: no

## 2025-01-10 NOTE — PROGRESS NOTES
Department of Orthopedic Surgery     Progress Note    Subjective:   Admit Day:1/9/2025    Patient is comfortable appearing.  Denies chest pain, shortness of air or calf pain.  Tolerating PO.     Objective::    height is 1.778 m (5' 10\") and weight is 124.3 kg (274 lb). His oral temperature is 97.9 °F (36.6 °C). His blood pressure is 153/87 (abnormal) and his pulse is 82. His respiration is 16 and oxygen saturation is 97%.     Dressing cdi, calf soft, neg kyle, nvi      Labs:  Lab Results   Component Value Date/Time    WBC 9.9 01/07/2025 11:35 AM    HGB 12.2 01/07/2025 11:35 AM    HCT 33.9 01/07/2025 11:35 AM     01/07/2025 11:35 AM       Lab Results   Component Value Date/Time    INR 1.22 01/07/2025 11:35 AM       Lab Results   Component Value Date/Time     01/09/2025 11:51 AM    K 4.3 01/09/2025 11:51 AM    CO2 24 01/09/2025 11:51 AM    BUN 10 01/09/2025 11:51 AM    CREATININE 0.8 01/09/2025 11:51 AM    CALCIUM 9.2 01/09/2025 11:51 AM       Assessment:   Principal Problem:    Prepatellar bursitis, right knee  Resolved Problems:    * No resolved hospital problems. *      s/p Open I&D infectious pre patella bursitis      Plan:   Cont w pathway  Dc planning home after seen by ID and abx set up  LEAVE [facility] INTACT UNTIL POST OP APPT  Follow up w ortho next Thursday

## 2025-01-10 NOTE — CARE COORDINATION
Discharge Planning Note:    Chart reviewed and pt has possible IV medication needs upon discharge.  SW/LISA contacted Prisca  with [email or web address] requesting home infusion benefits check.  Prisca to report back with benefit allowances.        Electronically signed by SHWETA Prakash on 1/10/2025 at 1:35 PM

## 2025-01-10 NOTE — CONSULTS
Consult -Internal Medicine  Dr. Fisher  1/10/2025      PCP: Errol Torres MD  Referring Physician: Hudson Ricketts,*    Code Status: Full Code  Current Diet: ADULT DIET; Regular      Cc: Jaden Tellez is a64 y.o. male who presents with Prepatellar bursitis, right knee.      Problem list of hospitalization thus far:  Active Hospital Problems    Diagnosis Date Noted    Prepatellar bursitis, right knee [M70.41] 01/09/2025    HTN (hypertension) [I10] 05/09/2024    High cholesterol [E78.00] 05/09/2024    Hyponatremia [E87.1] 05/09/2024     HPI: Jaden Tellez has been admitted by Hudson Ricketts,* with R knee bursitis.      Pt presents with with above complaint.    Pt has has severe pain to the R knee  Duration - going on for at least 3 month(s)  It is described as a constant pain that is aching, throbbing in quality.  Severity rated as 9 out of 10 at its worst  Pain is so severe that it limits usual activities  No improvement with medications, therapy or injections  As it is not improved with conservative measures, surgery has been recommended    Pt has undergone open I+D right knee without any apparent complications.  Pt is doing well post operatively.  Pain is controlled at this time.  I have been asked to see the patient for evaluation of his internal medicine issues:  has a past medical history of Hyperlipidemia and Hypertension..  Home meds have been reveiwed and restartedas indicated.  Pt denies having other complaints at this time.    Pt has been evaluated post operatively  Pain does appear to be controlled - on iv meds  Patient has not worked with therapy at this time      Electronic chart reviewed  Home meds reviewed and restarted as indicated  Op note, anesthesia flowsheet reviewed  Case d/w nursing    Doing ok post op  Has not worked with therapy  ID eval pending      Review of Systems: (1 system for EPF, 2-9 for detailed, 10+ for comprehensive)  Constitutional: Negative for chills and  problem. Stable.    Plan: cont on meds    HTN (hypertension) -Established problem. Stable.  168/84  Plan: Pt home BP meds reviewed and will be continued. IV Hydralazine ordered for control of extremely high blood pressures.   Will monitor labs to assess Creat/K for possible complications of medications.     Hyponatremia  Plan: fluids now, recheck        Case discussed with: josué solano PA-C      MDM Determination    PROBLEM  High: life threatening unstable chronic illness (I.e. severe COPD, asthma) or acute illness (i.e. MI, PE, ROXANE, stroke,severe resp distress, acute change in neurologic status, suicidal ideation)  PLUS  high: iv narcotics or other iv meds which require monitoring (e.g. digoxin, amiodarone, vancomycin, coumadin, heparin, antiepileptics, chemotherapy,insulin drip, procrit) and high: at least 2 of: personally interpret study, discuss with external specialist, review/order 3+ tests - cbc bmp inr    OR TIME BASED  N/A - see problem based determination above    Thanks for the consult!  Will follow along daily while patient is in house.      (Please note that portions of this note were completed with a voice recognition program.  Efforts were made to edit the dictations but occasionally words are mis-transcribed.)      Carlos Enrique Fisher MD  1/10/2025    Please use [employer] to contact me with any questions during the day.   The hospitalist service will provide cross-coverage for this patient from 7pm to 7am.    During those hours, contact the on-call hospitalist MD/RONAN for questions.

## 2025-01-10 NOTE — CONSULTS
Infectious Diseases   Consult Note      Reason for Consult: Right knee septic bursitis  Requesting Physician: Dr. Ricketts    Date of Admission: 1/9/2025  Subjective:   CHIEF COMPLAINT: knee pain     HPI:  64-year-old male with history of right knee TKA, HTN and HLD that presented on 1/9 with complaints of right knee pain that has been progressively worsening for the past 3 months.  He has tried steroid injections without improvement.  He was evaluated by orthopedics and had a right knee aspirate performed on 1/7 that did show heavy growth of MSSA.  It was therefore recommended that he come in for an I&D.  Upon presentation, WBC was 9.9 and he was afebrile.  He was empirically given vancomycin.  The patient is now status post I&D of the right knee that was performed on 1/9.  IntraOp findings of moderate purulent fluid in the prepatellar region of the knee without communication to the knee joint.  The patient does not appear to have any IntraOp cultures collected.  Today he also reports circular itchy slight scaly lesions throughout his body. He states wife had ringworm a month ago, he has had these for about 1.5 weeks.                      Current abx: Vancomycin         Past Surgical History:       Diagnosis Date    Hyperlipidemia     Hypertension          Procedure Laterality Date    COLONOSCOPY      EYE SURGERY Bilateral     cataract    JOINT REPLACEMENT      KNEE SURGERY Right 1/9/2025    RIGHT KNEE IRRIGATION AND DEBRIDMENT performed by Hudson Ricketts MD at [facility] OR    REVISION TOTAL KNEE ARTHROPLASTY Right 5/9/2024    REVISION RIGHT TOTAL KNEE ARTHROPLASTY-ADDUCTOR BLOCK-REG performed by Hudson Ricketts MD at [facility] OR    SHOULDER SURGERY Right     TONSILLECTOMY         Social History:    TOBACCO:   reports that he has never smoked. He has never used smokeless tobacco.  ETOH:   reports current alcohol use of about 7.0 standard drinks of alcohol per week.  There is no history of illicit  and legs. These appear about 1cm or less in diameter.   NEUROLOGIC: nonfocal exam  ACCESS: PIV    DATA:    Old records have been reviewed    CBC:  Recent Labs     01/07/25  1135   WBC 9.9   RBC 3.51*   HGB 12.2*   HCT 33.9*      MCV 96.5   MCH 34.8*   MCHC 36.0   RDW 12.6      BMP:  Recent Labs     01/07/25  1135 01/09/25  1151   * 131*   K 4.1 4.3   CL 91* 95*   CO2 22 24   BUN 9 10   CREATININE 0.7* 0.8   CALCIUM 9.0 9.2   GLUCOSE 113* 103*        Cultures:   Right knee aspirate 1/7: Heavy growth of MSSA  Susceptibility     Staphylococcus aureus     BACTERIAL SUSCEPTIBILITY PANEL BY YULISSA     clindamycin <=0.25 mcg/mL Sensitive     erythromycin <=0.25 mcg/mL Sensitive     levofloxacin <=0.12 mcg/mL Sensitive     linezolid 2 mcg/mL Sensitive     oxacillin <=0.25 mcg/mL Sensitive     tetracycline <=1 mcg/mL Sensitive     trimethoprim-sulfamethoxazole <=10 mcg/mL Sensitive     vancomycin <=0.5 mcg/mL Sensitive                 Urine culture 1/7: Negative  MRSA Nares 1/7: Negative    Radiology Review:  All pertinent images / reports were reviewed as a part of this visit.   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      Right knee prepatellar bursitis with MSSA:  -Patient has history of right knee TKA and recent worsening of right knee swelling and pain since the past 3 months.  -Outpatient right knee aspirate on 1/7 with heavy growth of MSSA.  -Patient is now status post right knee I&D on 1/9 with findings of \" moderate purulent fluid in the prepatellar region of the knee without evidence of

## 2025-01-10 NOTE — CARE COORDINATION
Case Management -  Discharge Note      Patient Name: Jaden Tellez                   YOB: 1960  Room: [unfilled]            Readmission Risk (Low < 19, Mod (19-27), High > 27): Readmission Risk Score: 5    Current PCP: Errol Torres MD      ([facility]) Important Message from Medicare:    Has pt received appropriate compliance notices before being discharged if required: N/A    PT AM-PAC: 24 /24  OT AM-PAC:   /24    Patient is on IV antibiotics,    [employer]  Phone: [phone]  Fax: [phone]    To deliver medications and supplies this evening. CM Manager, Chula Townsend called in orders to pharmacy.     [address],   [place] [address]  Phone: [phone]  Fax: [phone]    Will do start of care tomorrow morning.    Financial    Payor: UMR / Plan: UMR / Product Type: *No Product type* /     Pharmacy:  Potential assistance Purchasing Medications: No  Meds-to-Beds request: Yes    No Pharmacies Listed    Notes:    Additional Case Management Notes: Wife to transport home. All CM needs met.         Electronically signed by SHWETA Prakash on 1/10/2025 at 3:31 PM

## 2025-01-10 NOTE — PROGRESS NOTES
CLINICAL PHARMACY NOTE: MEDS TO BEDS    Total # of Prescriptions Filled: 2   The following medications were delivered to the patient:  OXYCODONE HCL 5MG TABS  ASPIRIN LOW DOSE 81MG TBEC    Additional Documentation: [facility] approved to deliver medications to patient room=signed  [facility]acy Tech

## 2025-01-10 NOTE — PROGRESS NOTES
Pt resting awake in bed. Up to bathroom under supervision, carrying walker. Is eager to be independent. Will wait for PT/ot to round. Pt is axox4 and able to answer questions and follow commands throughout assessment. PO meds taken easily. Pt reports full sensation in ANA LE, warm to touch and can moves toes on command ANA.

## 2025-01-10 NOTE — CARE COORDINATION
Called [facility] to give IV abx order at [phone].  Provided the following information to [facility].  INFUSION ORDERS:  - Drug: IV cefazolin 2 g every 8 hour via midline  - Planned End date: 1/30/2025  - Diagnosis: Right knee prepatellar bursitis with MSSA  - Has received test dose in hospital  - Routine   - Check CBC w diff, CMP, ESR, CRP every Mon or Tue - FAX result to [phone]  - Call with antibiotic / infusion issues, [phone]  - Call with any change in status, transfer in or out of a facility or to hospital; This HARESH is only valid for current disposition - [phone].    - No f/u in outpatient ID office necessary ID MD Chula Mata, MSN, RN, Case Management  Office: [phone]  Electronically signed by Chula Townsend on 1/10/2025 at 1:39 PM

## 2025-01-10 NOTE — PROGRESS NOTES
2005: Assessment complete, VSS, pulses and sensation intact in ANA LE, knee immobilizer in place to RLE, LILIA dressing checked and working properly, no drainage noted on ace wrap bandage, pt reports minimal pain, ambulated to the BR, tolerated well, discussed care plan, pt mutually agrees.  Maria C Vieira RN

## 2025-01-10 NOTE — CARE COORDINATION
Case Management Assessment  Initial Evaluation    Date/Time of Evaluation: 1/10/2025 3:29 PM  Assessment Completed by: SHWETA Prakash    If patient is discharged prior to next notation, then this note serves as note for discharge by case management.    Patient Name: Jaden Tellez                   YOB: 1960  Diagnosis: Presence of right artificial knee joint [Z96.651]  Other bursitis of knee, right knee [M70.51]  Prepatellar bursitis, right knee [M70.41]                   Date / Time: 1/9/2025 11:14 AM    Patient Admission Status: Outpatient in a bed   Readmission Risk (Low < 19, Mod (19-27), High > 27): Readmission Risk Score: 5    Current PCP: Errol Torres MD  PCP verified by CM? Yes    Chart Reviewed: Yes      History Provided by: Patient  Patient Orientation: Alert and Oriented    Patient Cognition: Alert    Hospitalization in the last 30 days (Readmission):  No    If yes, Readmission Assessment in  Navigator will be completed.    Advance Directives:      Code Status: Full Code   Patient's Primary Decision Maker is: Legal Next of Kin      Discharge Planning:    Patient lives with: Spouse/Significant Other Type of Home: House  Primary Care Giver: Self  Patient Support Systems include: Spouse/Significant Other, Children, Family Members   Current Financial resources: Other (Comment) ([facility])  Current community resources: None  Current services prior to admission: None            Current DME:              Type of Home Care services:  PT, OT, Nursing Services    ADLS  Prior functional level: Independent in ADLs/IADLs  Current functional level: Independent in ADLs/IADLs    PT AM-PAC: 24 /24  OT AM-PAC:   /24    Family can provide assistance at DC: Yes  Would you like Case Management to discuss the discharge plan with any other family members/significant others, and if so, who? Yes (Wife)  Plans to Return to Present Housing: Yes  Other Identified Issues/Barriers to RETURNING to

## 2025-01-10 NOTE — CONSULTS
PICC line education:    -Risks  -Benefits  -Alternatives  -Procedure    Discussed the above with patient, verbalized understanding, answered all questions. Provided with information on PICC care to review. PICC tip verified via [employer] (Ok to use). Reported off to patient's  Nurse Felix.

## 2025-01-10 NOTE — DISCHARGE INSTR - COC
Continuity of Care Form    Patient Name: Jaden Tellez   :  1960  MRN:  [id number]    Admit date:  2025  Discharge date:  1/10/25    Code Status Order: Full Code   Advance Directives:   Advance Care Flowsheet Documentation        Date/Time Healthcare Directive Type of Healthcare Directive Copy in Chart Healthcare Agent Appointed Healthcare Agent's Name Healthcare Agent's Phone Number    [phone] No, patient does not have an advance directive for healthcare treatment  --  --  --  --  --                     Admitting Physician:  Hudson Ricketts MD  PCP: Errol Torres MD    Discharging Nurse: candice  Discharging Hospital Unit/Room#: 4TN-4461/4461-01  Discharging Unit Phone Number: [phone]    Emergency Contact:   Extended Emergency Contact Information  Primary Emergency Contact: Elena Tellez  [place] Phone: [phone]  Relation: Spouse    Past Surgical History:  Past Surgical History:   Procedure Laterality Date    COLONOSCOPY      EYE SURGERY Bilateral     cataract    JOINT REPLACEMENT      KNEE SURGERY Right 2025    RIGHT KNEE IRRIGATION AND DEBRIDMENT performed by Hudson Ricketts MD at [facility] OR    REVISION TOTAL KNEE ARTHROPLASTY Right 2024    REVISION RIGHT TOTAL KNEE ARTHROPLASTY-ADDUCTOR BLOCK-REG performed by Hudson Ricketts MD at [facility] OR    SHOULDER SURGERY Right     TONSILLECTOMY         Immunization History:   Immunization History   Administered Date(s) Administered    COVID-19, PFIZER PURPLE top, DILUTE for use, (age 12 y+), 30mcg/0.3mL 2021       Active Problems:  Patient Active Problem List   Diagnosis Code    High cholesterol E78.00    HTN (hypertension) I10    Hyponatremia E87.1    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    Prepatellar bursitis, right knee M70.41       Isolation/Infection:   Isolation            No Isolation          Patient Infection Status       None to display            Nurse  post op appt     Patient's personal belongings (please select all that are sent with patient):  None    RN SIGNATURE:  Electronically signed by EDUARDO HOLLINS RN on 1/10/25 at 1:45 PM EST    CASE MANAGEMENT/SOCIAL WORK SECTION    Inpatient Status Date: 01/09/2024    Readmission Risk Assessment Score:  [facility] RISK OF UNPLANNED READMISSION 2.0             6.2 Total Score        Discharging to Facility/ Agency     [facility]  [address],   [place] [address]  Phone: [phone]  Fax: [phone]      [employer] [facility]  Phone: [phone]  Fax: [phone]      / signature: Electronically signed by SHWETA Prakash on 1/10/2025 at 1:39 PM      PHYSICIAN SECTION    Prognosis: Guarded    Condition at Discharge: Stable    Rehab Potential (if transferring to Rehab): Good    Recommended Labs or Other Treatments After Discharge: ***  INFUSION ORDERS:  - Drug: IV cefazolin 2 g every 8 hour  - Planned End date: 1/30/2025  - Diagnosis: Right knee prepatellar bursitis with MSSA  - Has received test dose in hospital  - Routine   - Check CBC w diff, CMP, ESR, CRP every Mon or Tue - FAX result to [phone]  - Call with antibiotic / infusion issues, [phone]  - Call with any change in status, transfer in or out of a facility or to hospital; This HARESH is only valid for current disposition - [phone].    - No f/u in outpatient ID office necessary    Physician Certification: I certify the above information and transfer of Jaden Tellez  is necessary for the continuing treatment of the diagnosis listed and that he requires Home Care for greater 30 days.     Update Admission H&P: No change in H&P    PHYSICIAN SIGNATURE:  Electronically signed by Carlos Enrique Fisher MD on 1/10/25 at 2:17 PM EST

## 2025-01-10 NOTE — OP NOTE
Operative Note    Operative Report    Patient: Jaden Tellez  YOB: 1960  Age: 64 y.o.  Sex: male  MRN: [id number]    DATE OF OPERATION : 1/9/2025    SURGEON: Hudson Ricketts MD    ASSISTANT:  Bethel Astudillo is a board certified physician assistant who is medically necessary as a first assistant in the operating room with me. He is responsible for assisting me with positioning of the patient, retraction, cauterization and manipulation of the involved extremity to allow for improved visualization of the joint throughout the surgical procedure His presence in the operating room with me as a first assistant during surgical procedures enables me to perform the surgical procedure in a more timely and efficient manner. The [facility] does not provide me with a first assistant in the operating room who has the same surgical skills as my physician assistant.     PREOPERATIVE DIAGNOSIS: Right knee Infectious pre patella bursitis.     POSTOPERATIVE DIAGNOSIS: Right knee Infectious pre patella bursitis.     PROCEDURE: Right knee open irrigation and debridement infectious prepatellar bursitis     COMPLICATIONS: None.     CONSULTATIONS: None.     ANESTHESIA: General endotracheal.     EBL: minimal    INDICATIONS FOR SURGERY: The patient underwent a revision right total knee replacement in May 2024.  He was doing well up until this past weekend.  He noticed a small scab over the anterior aspect of his right knee.  He developed swelling on Sunday.  Tuesday morning, he woke up and there was a small opening over the anterior aspect of his knee with significant drainage.  He was evaluated in the office  that same day.  His clinical exam findings at that time were consistent with infectious prepatellar bursitis.  Wound cultures were obtained in the office.  I recommended surgical treatment.The risks and benefits of right  knee pre patella  bursectomy, irrigation and debridement were carefully

## 2025-01-12 LAB
BACTERIA SNV CULT: ABNORMAL
BACTERIA SPEC ANAEROBE CULT: ABNORMAL
GRAM STN SPEC: ABNORMAL
ORGANISM: ABNORMAL

## 2025-01-13 ENCOUNTER — CLINICAL DOCUMENTATION (OUTPATIENT)
Dept: INFECTIOUS DISEASES | Age: 65
End: 2025-01-13

## 2025-01-13 ENCOUNTER — TELEPHONE (OUTPATIENT)
Dept: INFECTIOUS DISEASES | Age: 65
End: 2025-01-13

## 2025-01-13 DIAGNOSIS — M70.41 PREPATELLAR BURSITIS, RIGHT KNEE: Primary | ICD-10-CM

## 2025-01-13 NOTE — TELEPHONE ENCOUNTER
[facility] and Danielle HARMAN and verified OPAT orders:    IV cefazolin 2 g every 8 hour  - Planned End date: 1/30/2025  CBC w diff, CMP, ESR, CRP

## 2025-01-13 NOTE — PROGRESS NOTES
He has placed a referral order for pharmacist to manage Outpatient Parental Antimicrobial Therapy (OPAT) pursuant the ID Collaborative Practice Agreement.     Pertinent PMH and HPI:  PMH htn, hld, diverticulosis, L TKA 2019, chronic edema in RLE    Remote R TKA  C/b fall resulting in loosening of tibial component- needed revision 5/9/24  Presents 1/9 with drainage from incision      Pertinent Objective Data:    Wt Readings from Last 1 Encounters:   01/09/25 124.3 kg (274 lb)      BMI Readings from Last 1 Encounters:   01/09/25 39.31 kg/m²      Serum creatinine: 0.7 mg/dL (L) 01/10/25 1141  Estimated creatinine clearance: 141 mL/min (A)    Lab Results   Component Value Date    CRP 42.1 (H) 01/10/2025       Lab Results   Component Value Date    SEDRATE 83 (H) 01/10/2025       Micro:   1/7 MRSA nares negative  1/7 synovial fluid:  Staphylococcus aureus (1)    Antibiotic Interpretation YULISSA     clindamycin Sensitive <=0.25 mcg/mL    erythromycin Sensitive <=0.25 mcg/mL    levofloxacin Sensitive <=0.12 mcg/mL    linezolid Sensitive 2 mcg/mL    oxacillin Sensitive <=0.25 mcg/mL    tetracycline Sensitive <=1 mcg/mL    trimethoprim-sulfamethoxazole Sensitive <=10 mcg/mL    vancomycin Sensitive <=0.5 mcg/mL        OPAT Orders:  Diagnosis  R knee bursitis s/p I&D 1/9:    \"There was significant purulent fluid present in the anterior compartment of the knee. There was significant erythema of the prepatellar bursa tissue.There was no communication into the knee joint. The underlying patella tendon was intact.\"     Antimicrobial Regimen and Projected Term Date IV cefazolin 3gm q8hr- 1/30  Increased from 2gm due to weight of 124 kg    (3 weeks)   Weekly Lab Monitoring CBCwDiff, CMP, CRP, and ESR   Any additional imaging or data needed prior to term? None   Any follow up in ID clinic? None   OPAT Access/Additional LDA R PICC   Disposition  [facility] / [facility]    1/13 2:11pm- doing well at home; spouse helping with abx; educated on

## 2025-01-13 NOTE — TELEPHONE ENCOUNTER
----- Message from Maty Oneill RP sent at 1/13/2025  2:14 PM EST -----  Inc dose to 3gm based on weight of 124 kg    Spoke with Nury and advised of pharmacist note to increase cefazolin to 3g.  She v/u.    Pharmacist spoke with patient and advised of above.

## 2025-01-15 LAB
ALBUMIN: 4.3 GM/DL (ref 3.2–4.6)
ALP BLD-CCNC: 71 U/L (ref 40–129)
ALT SERPL-CCNC: 15 U/L
ANION GAP SERPL CALCULATED.3IONS-SCNC: 13 MMOL/L (ref 7–16)
AST SERPL-CCNC: 24 U/L
BASOPHILS ABSOLUTE: 0 X10(3)/MCL (ref 0–0.1)
BASOPHILS RELATIVE PERCENT: 0.7 %
BILIRUB SERPL-MCNC: 0.4 MG/DL (ref 0.2–1.4)
BUN BLDV-MCNC: 10 MG/DL (ref 8–23)
C-REACTIVE PROTEIN WIDE RANGE: 5.61 MG/L
CALCIUM SERPL-MCNC: 9.2 MG/DL (ref 8.8–10.4)
CHLORIDE BLD-SCNC: 96 MMOL/L (ref 98–107)
CO2: 24 MMOL/L (ref 22–29)
CREAT SERPL-MCNC: 0.75 MG/DL (ref 0.67–1.3)
EGFR (CKD-EPI): 101 ML/MIN/1.73 M2
EOSINOPHIL # BLD: 7.5 %
EOSINOPHILS ABSOLUTE: 0.4 X10(3)/MCL (ref 0–0.5)
GLUCOSE BLD-MCNC: 100 MG/DL (ref 70–99)
HCT VFR BLD CALC: 38.2 % (ref 40–51)
HEMOGLOBIN: 13.4 G/DL (ref 13.7–17.5)
IMMATURE CELLS ABSOLUTE COUNT: 0.1 X10(3)/MCL (ref 0–0.1)
IMMATURE GRANULOCYTES %: 0.9 %
LYMPHOCYTES # BLD: 24.4 %
LYMPHOCYTES ABSOLUTE: 1.4 X10(3)/MCL (ref 1.2–3.9)
MCH RBC QN AUTO: 33.7 PG (ref 26–34)
MCHC RBC AUTO-ENTMCNC: 35.1 G/DL (ref 30.7–35.5)
MCV RBC AUTO: 96 FL (ref 80–100)
MONOCYTES ABSOLUTE: 0.6 X10(3)/MCL (ref 0.3–0.9)
MONOCYTES RELATIVE PERCENT: 10.2 %
NEUTROPHILS ABSOLUTE: 3.1 X10(3)/MCL (ref 1.6–6.1)
NEUTROPHILS: 56.3 %
PDW BLD-RTO: 12.4 %
PLATELET # BLD: 376 X10(3)/MCL (ref 155–369)
PMV BLD AUTO: 8.3 FL (ref 8.8–12.5)
POTASSIUM SERPL-SCNC: 4.5 MMOL/L (ref 3.5–5)
RBC # BLD: 3.98 X10(6)/MCL (ref 4.6–6.1)
SED RATE, AUTOMATED: 49 MM/HR (ref 0–20)
SODIUM BLD-SCNC: 133 MMOL/L (ref 136–145)
TOTAL PROTEIN: 7.5 GM/DL (ref 6.4–8.3)
WBC # BLD: 5.6 X10(3)/MCL (ref 3.7–10.3)

## 2025-01-20 ENCOUNTER — TELEPHONE (OUTPATIENT)
Dept: INFECTIOUS DISEASES | Age: 65
End: 2025-01-20

## 2025-01-20 NOTE — TELEPHONE ENCOUNTER
OPAT Nurse Coordinator Weekly Update Note    Current OPAT plan:  IV cefazolin 3 g every 8 hour  - Planned End date: 1/30/2025    Diagnosis:  Right knee prepatellar bursitis with MSSA    Assessment:  Patient reports he is doing fine.  Denies fevers.  Drsg to Right knee c/d/I with immobilizer in place. [facility] RN coming tomorrow to draw labs.    Follow up appts:  Ortho 1/23

## 2025-01-27 ENCOUNTER — TELEPHONE (OUTPATIENT)
Dept: INFECTIOUS DISEASES | Age: 65
End: 2025-01-27

## 2025-01-27 RX ORDER — MICONAZOLE NITRATE 20 MG/G
CREAM TOPICAL
Qty: 35 G | Refills: 1 | Status: SHIPPED | OUTPATIENT
Start: 2025-01-27

## 2025-01-27 NOTE — TELEPHONE ENCOUNTER
I reached out to ortho for update-  IGOR Nobles states that the site looked great, sutures were just not quite ready to come out yet.  No concerns with dehiscence.     I would like to see labs prior to stopping IV.  May be able to transition to PO for one more week to ensure suture removal is seamless if labs look okay.     How are his tinea spots?  He got two weeks of miconazole. Duration of therapy is 2-4 weeks. Will provide one more refill but if they are worsening or persists beyond this refill, may need further assessment.     Maty Oneill, PharmD, BCPS  Clinical Pharmacy Specialist- [facility] Infectious Disease  Phone: [phone] (also available on [employer])  Fax: [phone]    For Pharmacy Admin Tracking Only    Program: Medical Group  CPA in place: Yes  Intervention Detail: Refill(s) Provided  Time Spent (min): 15

## 2025-01-27 NOTE — TELEPHONE ENCOUNTER
OPAT Nurse Coordinator Weekly Update Note    Current OPAT plan:  IV cefazolin 3 g every 8 hour  - Planned End date: 1/30/2025    Diagnosis:  Right knee prepatellar bursitis with MSSA    Assessment:  Patient is doing well. Denies fevers.  Saw ortho PA last Thursday who states his knee looks good.  Sutures were left in and will be removed next appt on 2/4.  C RN unable to draw labs from PICC.  Will go to outpt lab on wed.       Patient requesting refill on Miconazole to pharmacy on file     Follow up appts:  Dr. Nj 2/4     Winlevi Pregnancy And Lactation Text: This medication is considered safe during pregnancy and breastfeeding.

## 2025-01-28 LAB
ALBUMIN: 4.1 G/DL
ALBUMIN: 4.1 G/DL (ref 3.5–5.7)
ALP BLD-CCNC: 51 IU/L (ref 35–135)
ALP BLD-CCNC: 51 U/L
ALT SERPL-CCNC: 6 IU/L (ref 10–60)
ALT SERPL-CCNC: 6 U/L
ANION GAP SERPL CALCULATED.3IONS-SCNC: 7 MMOL/L
ANION GAP SERPL CALCULATED.3IONS-SCNC: 7 MMOL/L (ref 4–16)
AST SERPL-CCNC: 20 IU/L (ref 10–40)
AST SERPL-CCNC: 20 U/L
BASOPHILS ABSOLUTE: 0 /ΜL
BASOPHILS ABSOLUTE: 0 THOU/MCL (ref 0–0.2)
BASOPHILS ABSOLUTE: 1 %
BASOPHILS RELATIVE PERCENT: 1 %
BILIRUB SERPL-MCNC: 0.5 MG/DL (ref 0.1–1.4)
BILIRUB SERPL-MCNC: 0.5 MG/DL (ref 0–1.2)
BUN BLDV-MCNC: 12 MG/DL
BUN BLDV-MCNC: 12 MG/DL (ref 8–26)
C-REACTIVE PROTEIN WIDE RANGE: 9 MG/L
C-REACTIVE PROTEIN: 9
CALCIUM SERPL-MCNC: 9.3 MG/DL
CALCIUM SERPL-MCNC: 9.3 MG/DL (ref 8.5–10.4)
CHLORIDE BLD-SCNC: 97 MMOL/L
CHLORIDE BLD-SCNC: 97 MMOL/L (ref 98–111)
CO2: 29 MMOL/L
CO2: 29 MMOL/L (ref 21–31)
CREAT SERPL-MCNC: 0.7 MG/DL
CREAT SERPL-MCNC: 0.7 MG/DL (ref 0.7–1.3)
EGFR (CKD-EPI): 103 ML/MIN/1.73 M2
EOSINOPHILS ABSOLUTE: 0.5 /ΜL
EOSINOPHILS ABSOLUTE: 0.5 THOU/MCL (ref 0.03–0.45)
EOSINOPHILS RELATIVE PERCENT: 11 %
EOSINOPHILS RELATIVE PERCENT: 11 %
GFR, ESTIMATED: 103
GLUCOSE BLD-MCNC: 94 MG/DL
GLUCOSE BLD-MCNC: 94 MG/DL (ref 70–99)
HCT VFR BLD CALC: 12.8 % (ref 41–53)
HCT VFR BLD CALC: 35.7 % (ref 40–50)
HEMOGLOBIN: 12.8 G/DL (ref 13.5–16.5)
HEMOGLOBIN: 12.8 G/DL (ref 13.5–17.5)
LYMPHOCYTES ABSOLUTE: 1.1 /ΜL
LYMPHOCYTES ABSOLUTE: 1.1 THOU/MCL (ref 1–4)
LYMPHOCYTES RELATIVE PERCENT: 23 %
LYMPHOCYTES RELATIVE PERCENT: 23 %
MCH RBC QN AUTO: 34.3 PG
MCH RBC QN AUTO: 34.3 PG (ref 27–33)
MCHC RBC AUTO-ENTMCNC: 35.9 G/DL
MCHC RBC AUTO-ENTMCNC: 35.9 G/DL (ref 32–36)
MCV RBC AUTO: 95.5 FL
MCV RBC AUTO: 95.5 FL (ref 82–97)
MONOCYTES ABSOLUTE: 0.5 /ΜL
MONOCYTES ABSOLUTE: 0.5 THOU/MCL (ref 0.2–0.9)
MONOCYTES RELATIVE PERCENT: 10 %
MONOCYTES RELATIVE PERCENT: 10 %
NEUTROPHILS ABSOLUTE: 2.6 /ΜL
NEUTROPHILS ABSOLUTE: 2.6 THOU/MCL (ref 1.8–7.7)
NEUTROPHILS RELATIVE PERCENT: 55 %
PDW BLD-RTO: 12.7 %
PDW BLD-RTO: 12.7 % (ref 12.3–17)
PLATELET # BLD: 236 K/ΜL
PLATELET # BLD: 236 THOU/MCL (ref 140–375)
PMV BLD AUTO: 7.3 FL
PMV BLD AUTO: 7.3 FL (ref 7.4–11.5)
POTASSIUM SERPL-SCNC: 4.1 MMOL/L
POTASSIUM SERPL-SCNC: 4.1 MMOL/L (ref 3.6–5.1)
RBC # BLD: 3.73 10^6/ΜL
RBC # BLD: 3.73 MIL/MCL (ref 4.4–5.8)
SED RATE, AUTOMATED: 49
SED RATE, AUTOMATED: 49 MM/HR (ref 0–20)
SEG NEUTROPHILS: 55 %
SODIUM BLD-SCNC: 133 MMOL/L
SODIUM BLD-SCNC: 133 MMOL/L (ref 135–145)
TOTAL PROTEIN: 6.6 G/DL (ref 6.4–8.2)
TOTAL PROTEIN: 6.6 G/DL (ref 6–8)
WBC # BLD: 4.6 10^3/ML
WBC # BLD: 4.6 THOU/MCL (ref 3.6–10.5)

## 2025-01-28 NOTE — TELEPHONE ENCOUNTER
CRP is down to 9.  Discussed with Dr. Cortes-   If patient is amenable, prefer to keep IV cefazolin on board until 2/7 to ensure sutures are removed and that wound stays closed without dehiscence.     Maty Oneill, PharmD, [facility]  Clinical Pharmacy Specialist- [facility] Infectious Disease  Phone: [phone] (also available on [employer])  Fax: [phone]    For Pharmacy Admin Tracking Only    Program: Medical Group  CPA in place: Yes  Intervention Detail: Refill(s) Provided  Time Spent (min): 10

## 2025-01-29 DIAGNOSIS — M70.41 PREPATELLAR BURSITIS, RIGHT KNEE: ICD-10-CM

## 2025-01-30 ENCOUNTER — TELEPHONE (OUTPATIENT)
Dept: INFECTIOUS DISEASES | Age: 65
End: 2025-01-30

## 2025-01-30 NOTE — TELEPHONE ENCOUNTER
Spoke with patient regarding tinea spots.  He reports that the Miconazole is helping some spots but he is also getting new spots and more of them.  He reports he does not have the newest RX yet, that the pharmacy had to order it.

## 2025-01-30 NOTE — TELEPHONE ENCOUNTER
Spoke with Nury at [facility], Kadi QUICK at \[facility]\"", and patient and advised of pharmacist note to extend IV abx until 2/7/25.  All v/u

## 2025-02-04 ENCOUNTER — HOSPITAL ENCOUNTER (OUTPATIENT)
Age: 65
Setting detail: SPECIMEN
Discharge: HOME | End: 2025-02-04
Payer: COMMERCIAL

## 2025-02-04 ENCOUNTER — TELEPHONE (OUTPATIENT)
Dept: INFECTIOUS DISEASES | Age: 65
End: 2025-02-04

## 2025-02-04 LAB
ALBUMIN SERPL-MCNC: 4.1 G/DL (ref 3.4–5)
ALBUMIN/GLOB SERPL: 1.6 {RATIO} (ref 1.1–2.2)
ALP SERPL-CCNC: 67 U/L (ref 40–129)
ALT SERPL-CCNC: 12 U/L (ref 10–40)
ANION GAP SERPL CALCULATED.3IONS-SCNC: 8 MMOL/L (ref 3–16)
AST SERPL-CCNC: 26 U/L (ref 15–37)
BASOPHILS # BLD: 0 K/UL (ref 0–0.2)
BASOPHILS NFR BLD: 0.8 %
BILIRUB SERPL-MCNC: 0.4 MG/DL (ref 0–1)
BUN SERPL-MCNC: 12 MG/DL (ref 7–20)
CALCIUM SERPL-MCNC: 9.3 MG/DL (ref 8.3–10.6)
CHLORIDE SERPL-SCNC: 99 MMOL/L (ref 99–110)
CO2 SERPL-SCNC: 26 MMOL/L (ref 21–32)
CREAT SERPL-MCNC: 0.8 MG/DL (ref 0.8–1.3)
CRP SERPL-MCNC: <3 MG/L (ref 0–5.1)
DEPRECATED RDW RBC AUTO: 12.8 % (ref 12.4–15.4)
EOSINOPHIL # BLD: 0.5 K/UL (ref 0–0.6)
EOSINOPHIL NFR BLD: 10.2 %
ERYTHROCYTE [SEDIMENTATION RATE] IN BLOOD BY WESTERGREN METHOD: 29 MM/HR (ref 0–20)
GFR SERPLBLD CREATININE-BSD FMLA CKD-EPI: >90 ML/MIN/{1.73_M2}
GLUCOSE SERPL-MCNC: 112 MG/DL (ref 70–99)
HCT VFR BLD AUTO: 35 % (ref 40.5–52.5)
HGB BLD-MCNC: 12.6 G/DL (ref 13.5–17.5)
LYMPHOCYTES # BLD: 0.9 K/UL (ref 1–5.1)
LYMPHOCYTES NFR BLD: 18.6 %
MCH RBC QN AUTO: 33.8 PG (ref 26–34)
MCHC RBC AUTO-ENTMCNC: 36 G/DL (ref 31–36)
MCV RBC AUTO: 93.9 FL (ref 80–100)
MONOCYTES # BLD: 0.5 K/UL (ref 0–1.3)
MONOCYTES NFR BLD: 9.9 %
NEUTROPHILS # BLD: 3 K/UL (ref 1.7–7.7)
NEUTROPHILS NFR BLD: 60.5 %
PLATELET # BLD AUTO: 202 K/UL (ref 135–450)
PMV BLD AUTO: 7.6 FL (ref 5–10.5)
POTASSIUM SERPL-SCNC: 4.2 MMOL/L (ref 3.5–5.1)
PROT SERPL-MCNC: 6.7 G/DL (ref 6.4–8.2)
RBC # BLD AUTO: 3.73 M/UL (ref 4.2–5.9)
SODIUM SERPL-SCNC: 133 MMOL/L (ref 136–145)
WBC # BLD AUTO: 5 K/UL (ref 4–11)

## 2025-02-04 PROCEDURE — 36415 COLL VENOUS BLD VENIPUNCTURE: CPT

## 2025-02-04 PROCEDURE — 85025 COMPLETE CBC W/AUTO DIFF WBC: CPT

## 2025-02-04 PROCEDURE — 80053 COMPREHEN METABOLIC PANEL: CPT

## 2025-02-04 PROCEDURE — 86140 C-REACTIVE PROTEIN: CPT

## 2025-02-04 PROCEDURE — 85652 RBC SED RATE AUTOMATED: CPT

## 2025-02-04 NOTE — TELEPHONE ENCOUNTER
OPAT patient finishing IV cefazolin for R knee bursitis. Has TKA in place with no involvement.     Called patient-  Saw ortho today and sutures removed. Site looks good. No swelling and pain.    In regards to tinea spots, he states they are still present. Apparently he never got the miconazole rx I sent and he has been \"using whatever is in the house.\"    I encouraged him to f/u with PCP for this as it is not really an ID-specific issue that needs to be managed by us. He v/u.    Can stop IV cefazolin and remove PICC after dose 2/7.     Maty Oneill, PharmD, BCPS  Clinical Pharmacy Specialist- [facility] Infectious Disease  Phone: [phone] (also available on [employer])  Fax: [phone]    For Pharmacy Admin Tracking Only    Program: Medical Group  CPA in place: Yes  Intervention Detail: Discontinued Rx: 1, reason: Therapy Complete  Time Spent (min): 15

## 2025-02-05 NOTE — TELEPHONE ENCOUNTER
OPAT End  Call made to pharmacy  Spoke with Chiquis at [facility] and advised ok to end IV abx and pull PICC as planned on 2/7/25.  She v.u     Call made to A  Spoke with at Hudson RN at [facility] and advised of above.  He v/u    Call made to patient  Yes per Maty pharmacist     Will f/u in 1-2 days to verify line removal  2/7/25 1147 spoke with Kadi QUICK at [facility].  States she is pulling PICC today

## (undated) DEVICE — DRAPE,REIN 53X77,STERILE: Brand: MEDLINE

## (undated) DEVICE — BANDAGE COMPR W6INXL10YD ST M E WHITE/BEIGE

## (undated) DEVICE — SOLUTION IRRIG 2000ML 0.9% SOD CHL USP UROMATIC PLAS CONT

## (undated) DEVICE — TOTAL KNEE: Brand: MEDLINE INDUSTRIES, INC.

## (undated) DEVICE — KNEE HOLDER DISPOSABLE LINER: Brand: ALVARADO®  KNEE SUPPORT

## (undated) DEVICE — 1LYRTR 16FR10ML 100%SILI SNAP: Brand: MEDLINE INDUSTRIES, INC.

## (undated) DEVICE — SOLUTION IRRIG 1000ML 0.9% SOD CHL USP POUR PLAS BTL

## (undated) DEVICE — STERILE TOTAL KNEE DRAPE PACK: Brand: CARDINAL HEALTH

## (undated) DEVICE — HOOD, PEEL-AWAY: Brand: FLYTE

## (undated) DEVICE — SCREW BNE L25MM DIA2.5MM KNEE FULL THRD HEX FEM PERSONA (NOT IMPLANTED)

## (undated) DEVICE — SNAPSECURE FOLEY DEVICE: Brand: MEDLINE

## (undated) DEVICE — DRESSING CNTCT 4X12IN IS THERABOND 3D

## (undated) DEVICE — PIN HOLDING HDLSS 3.2X75 MM TROCAR ZUK

## (undated) DEVICE — DRAPE,U/ SHT,SPLIT,PLAS,STERIL: Brand: MEDLINE

## (undated) DEVICE — TOTAL BASIC PK

## (undated) DEVICE — LOTION PREP REMV 5OZ IODO CLR TINC OF BENZ DURAPREP

## (undated) DEVICE — HOOD: Brand: T7PLUS

## (undated) DEVICE — HANDPIECE SET WITH HIGH FLOW TIP AND SUCTION TUBE: Brand: INTERPULSE

## (undated) DEVICE — DUAL CUT SAGITTAL BLADE

## (undated) DEVICE — ADHESIVE SKIN CLOSURE WND 8.661X1.5 IN 22 CM LIQUIBAND SECUR

## (undated) DEVICE — BANDAGE COBAN 6 IN WND 6INX5YD FOAM

## (undated) DEVICE — HYPODERMIC SAFETY NEEDLE: Brand: MAGELLAN

## (undated) DEVICE — FAIRFIELD KNEE LF

## (undated) DEVICE — SOLUTION IRRIG 1000ML STRL H2O USP PLAS POUR BTL

## (undated) DEVICE — DECANTER FLD 9IN ST BG FOR ASEP TRNSF OF FLD

## (undated) DEVICE — PICO 7 10CM X 30CM: Brand: PICO™ 7

## (undated) DEVICE — RECIPROCATING BLADE, DOUBLE SIDED, OFFSET  (70.0 X 0.64 X 12.6MM)

## (undated) DEVICE — COVER,MAYO STAND,XL,STERILE: Brand: MEDLINE

## (undated) DEVICE — 450 ML BOTTLE OF 0.05% CHLORHEXIDINE GLUCONATE IN 99.95% STERILE WATER FOR IRRIGATION, USP AND APPLICATOR.: Brand: IRRISEPT ANTIMICROBIAL WOUND LAVAGE

## (undated) DEVICE — APPLICATOR PREP 26ML 0.7% IOD POVACRYLEX 74% ISO ALC ST

## (undated) DEVICE — SHEET,DRAPE,53X77,STERILE: Brand: MEDLINE

## (undated) DEVICE — MERCY FAIRFIELD TURNOVER KIT: Brand: MEDLINE INDUSTRIES, INC.

## (undated) DEVICE — SUTURE VICRYL + SZ 0 L18IN ABSRB UD L36MM CT-1 1/2 CIR VCP840D

## (undated) DEVICE — CONTAINER,SPECIMEN,OR STERILE,4OZ: Brand: MEDLINE

## (undated) DEVICE — ELECTRODE ELECSURG L 10.2 CM PTFE COAT MONOPOLAR BLADE OPN

## (undated) DEVICE — ELECTRODE PT RET AD L9FT HI MOIST COND ADH HYDRGEL CORDED

## (undated) DEVICE — COVER LT HNDL BLU PLAS

## (undated) DEVICE — YANKAUER,OPEN TIP,W/O VENT,STERILE: Brand: MEDLINE INDUSTRIES, INC.

## (undated) DEVICE — PENCIL SMK EVAC TELSCP 3 M TBNG

## (undated) DEVICE — 3 BONE CEMENT MIXER: Brand: MIXEVAC

## (undated) DEVICE — SUTURE MONOCRYL + SZ 3-0 L27IN ABSRB UD PS1 L24MM 3/8 CIR REV MCP936H

## (undated) DEVICE — SUTURE VICRYL + SZ 2-0 L18IN ABSRB UD CT1 L36MM 1/2 CIR VCP839D

## (undated) DEVICE — HOOD WITH PEEL AWAY FACE SHIELD: Brand: T7PLUS

## (undated) DEVICE — SPLINT KNEE UNIV FOR LESS THAN 36IN L20IN FOAM LAM E CNTCT

## (undated) DEVICE — SUTURE VICRYL + SZ 1  18IN CT CR ABSRB VCP753D

## (undated) DEVICE — SOLUTION WND IRRIGATION 450 ML 0.5 PVP-I 0.9 NACL

## (undated) DEVICE — HOOD: Brand: FLYTE